# Patient Record
Sex: MALE | Race: WHITE | Employment: UNEMPLOYED | URBAN - METROPOLITAN AREA
[De-identification: names, ages, dates, MRNs, and addresses within clinical notes are randomized per-mention and may not be internally consistent; named-entity substitution may affect disease eponyms.]

---

## 2019-11-27 ENCOUNTER — HOSPITAL ENCOUNTER (EMERGENCY)
Age: 44
Discharge: HOME | End: 2019-11-27
Attending: EMERGENCY MEDICINE
Payer: COMMERCIAL

## 2019-11-27 ENCOUNTER — APPOINTMENT (OUTPATIENT)
Dept: GENERAL RADIOLOGY | Age: 44
End: 2019-11-27
Payer: COMMERCIAL

## 2019-11-27 ENCOUNTER — APPOINTMENT (OUTPATIENT)
Dept: CT IMAGING | Age: 44
End: 2019-11-27
Payer: COMMERCIAL

## 2019-11-27 VITALS
SYSTOLIC BLOOD PRESSURE: 162 MMHG | HEIGHT: 68 IN | DIASTOLIC BLOOD PRESSURE: 103 MMHG | BODY MASS INDEX: 35.92 KG/M2 | TEMPERATURE: 97.6 F | RESPIRATION RATE: 17 BRPM | WEIGHT: 237 LBS | HEART RATE: 69 BPM | OXYGEN SATURATION: 95 %

## 2019-11-27 DIAGNOSIS — M94.9 OSTEOCHONDRAL LESION OF TALAR DOME: ICD-10-CM

## 2019-11-27 DIAGNOSIS — M25.572 ACUTE LEFT ANKLE PAIN: Primary | ICD-10-CM

## 2019-11-27 DIAGNOSIS — M89.9 OSTEOCHONDRAL LESION OF TALAR DOME: ICD-10-CM

## 2019-11-27 PROCEDURE — 73700 CT LOWER EXTREMITY W/O DYE: CPT

## 2019-11-27 PROCEDURE — 99284 EMERGENCY DEPT VISIT MOD MDM: CPT

## 2019-11-27 PROCEDURE — 73610 X-RAY EXAM OF ANKLE: CPT

## 2019-11-27 ASSESSMENT — PAIN DESCRIPTION - PAIN TYPE: TYPE: ACUTE PAIN

## 2019-11-27 ASSESSMENT — ENCOUNTER SYMPTOMS
SHORTNESS OF BREATH: 0
NAUSEA: 0
VOMITING: 0
BACK PAIN: 0
ABDOMINAL PAIN: 0
COUGH: 0
SORE THROAT: 0

## 2019-11-27 ASSESSMENT — PAIN DESCRIPTION - ORIENTATION: ORIENTATION: LEFT

## 2019-11-27 ASSESSMENT — PAIN SCALES - GENERAL: PAINLEVEL_OUTOF10: 5

## 2019-11-27 ASSESSMENT — PAIN DESCRIPTION - LOCATION: LOCATION: ANKLE

## 2019-11-27 ASSESSMENT — PAIN DESCRIPTION - DESCRIPTORS: DESCRIPTORS: CONSTANT

## 2021-05-16 ENCOUNTER — APPOINTMENT (OUTPATIENT)
Dept: GENERAL RADIOLOGY | Age: 46
DRG: 192 | End: 2021-05-16
Payer: MEDICAID

## 2021-05-16 ENCOUNTER — HOSPITAL ENCOUNTER (INPATIENT)
Age: 46
LOS: 3 days | Discharge: HOME | DRG: 192 | End: 2021-05-20
Attending: EMERGENCY MEDICINE | Admitting: INTERNAL MEDICINE
Payer: MEDICAID

## 2021-05-16 DIAGNOSIS — R79.89 ELEVATED BRAIN NATRIURETIC PEPTIDE (BNP) LEVEL: ICD-10-CM

## 2021-05-16 DIAGNOSIS — I16.0 HYPERTENSIVE URGENCY: Primary | ICD-10-CM

## 2021-05-16 DIAGNOSIS — R00.0 TACHYCARDIA: ICD-10-CM

## 2021-05-16 DIAGNOSIS — I48.92 ATRIAL FLUTTER, UNSPECIFIED TYPE (HCC): ICD-10-CM

## 2021-05-16 LAB
BASOPHILS ABSOLUTE: 0.1 K/UL (ref 0–0.2)
BASOPHILS RELATIVE PERCENT: 0.9 %
EOSINOPHILS ABSOLUTE: 0.2 K/UL (ref 0–0.6)
EOSINOPHILS RELATIVE PERCENT: 2.2 %
HCT VFR BLD CALC: 38.9 % (ref 40.5–52.5)
HEMOGLOBIN: 12.6 G/DL (ref 13.5–17.5)
LYMPHOCYTES ABSOLUTE: 1.2 K/UL (ref 1–5.1)
LYMPHOCYTES RELATIVE PERCENT: 15.1 %
MCH RBC QN AUTO: 28.3 PG (ref 26–34)
MCHC RBC AUTO-ENTMCNC: 32.4 G/DL (ref 31–36)
MCV RBC AUTO: 87.4 FL (ref 80–100)
MONOCYTES ABSOLUTE: 0.8 K/UL (ref 0–1.3)
MONOCYTES RELATIVE PERCENT: 9.4 %
NEUTROPHILS ABSOLUTE: 5.8 K/UL (ref 1.7–7.7)
NEUTROPHILS RELATIVE PERCENT: 72.4 %
PDW BLD-RTO: 15.2 % (ref 12.4–15.4)
PLATELET # BLD: 275 K/UL (ref 135–450)
PMV BLD AUTO: 7.4 FL (ref 5–10.5)
RBC # BLD: 4.45 M/UL (ref 4.2–5.9)
WBC # BLD: 8 K/UL (ref 4–11)

## 2021-05-16 PROCEDURE — 83605 ASSAY OF LACTIC ACID: CPT

## 2021-05-16 PROCEDURE — 96376 TX/PRO/DX INJ SAME DRUG ADON: CPT

## 2021-05-16 PROCEDURE — 71046 X-RAY EXAM CHEST 2 VIEWS: CPT

## 2021-05-16 PROCEDURE — 96374 THER/PROPH/DIAG INJ IV PUSH: CPT

## 2021-05-16 PROCEDURE — 93005 ELECTROCARDIOGRAM TRACING: CPT | Performed by: EMERGENCY MEDICINE

## 2021-05-16 PROCEDURE — 2500000003 HC RX 250 WO HCPCS: Performed by: EMERGENCY MEDICINE

## 2021-05-16 PROCEDURE — 99283 EMERGENCY DEPT VISIT LOW MDM: CPT

## 2021-05-16 PROCEDURE — 96375 TX/PRO/DX INJ NEW DRUG ADDON: CPT

## 2021-05-16 PROCEDURE — 83880 ASSAY OF NATRIURETIC PEPTIDE: CPT

## 2021-05-16 PROCEDURE — 80053 COMPREHEN METABOLIC PANEL: CPT

## 2021-05-16 PROCEDURE — 2580000003 HC RX 258: Performed by: EMERGENCY MEDICINE

## 2021-05-16 PROCEDURE — 84484 ASSAY OF TROPONIN QUANT: CPT

## 2021-05-16 PROCEDURE — 85025 COMPLETE CBC W/AUTO DIFF WBC: CPT

## 2021-05-16 RX ORDER — LABETALOL HYDROCHLORIDE 5 MG/ML
10 INJECTION, SOLUTION INTRAVENOUS ONCE
Status: COMPLETED | OUTPATIENT
Start: 2021-05-16 | End: 2021-05-16

## 2021-05-16 RX ORDER — 0.9 % SODIUM CHLORIDE 0.9 %
500 INTRAVENOUS SOLUTION INTRAVENOUS ONCE
Status: COMPLETED | OUTPATIENT
Start: 2021-05-16 | End: 2021-05-17

## 2021-05-16 RX ADMIN — LABETALOL HYDROCHLORIDE 10 MG: 5 INJECTION, SOLUTION INTRAVENOUS at 23:55

## 2021-05-16 RX ADMIN — SODIUM CHLORIDE 500 ML: 9 INJECTION, SOLUTION INTRAVENOUS at 23:54

## 2021-05-16 ASSESSMENT — PAIN DESCRIPTION - LOCATION: LOCATION: LEG

## 2021-05-16 ASSESSMENT — PAIN DESCRIPTION - DESCRIPTORS: DESCRIPTORS: TIGHTNESS

## 2021-05-16 ASSESSMENT — PAIN DESCRIPTION - ORIENTATION: ORIENTATION: LOWER;LEFT;RIGHT

## 2021-05-17 PROBLEM — R65.10 SIRS (SYSTEMIC INFLAMMATORY RESPONSE SYNDROME) (HCC): Status: ACTIVE | Noted: 2021-05-17

## 2021-05-17 LAB
A/G RATIO: 1.3 (ref 1.1–2.2)
ALBUMIN SERPL-MCNC: 3.6 G/DL (ref 3.4–5)
ALBUMIN SERPL-MCNC: 3.8 G/DL (ref 3.4–5)
ALP BLD-CCNC: 85 U/L (ref 40–129)
ALP BLD-CCNC: 88 U/L (ref 40–129)
ALT SERPL-CCNC: 64 U/L (ref 10–40)
ALT SERPL-CCNC: 66 U/L (ref 10–40)
ANION GAP SERPL CALCULATED.3IONS-SCNC: 10 MMOL/L (ref 3–16)
ANION GAP SERPL CALCULATED.3IONS-SCNC: 7 MMOL/L (ref 3–16)
AST SERPL-CCNC: 32 U/L (ref 15–37)
AST SERPL-CCNC: 42 U/L (ref 15–37)
BASOPHILS ABSOLUTE: 0.1 K/UL (ref 0–0.2)
BASOPHILS RELATIVE PERCENT: 0.9 %
BILIRUB SERPL-MCNC: 0.7 MG/DL (ref 0–1)
BILIRUB SERPL-MCNC: 0.9 MG/DL (ref 0–1)
BILIRUBIN DIRECT: <0.2 MG/DL (ref 0–0.3)
BILIRUBIN URINE: NEGATIVE
BILIRUBIN, INDIRECT: ABNORMAL MG/DL (ref 0–1)
BLOOD, URINE: NEGATIVE
BUN BLDV-MCNC: 14 MG/DL (ref 7–20)
BUN BLDV-MCNC: 15 MG/DL (ref 7–20)
CALCIUM SERPL-MCNC: 8.8 MG/DL (ref 8.3–10.6)
CALCIUM SERPL-MCNC: 8.9 MG/DL (ref 8.3–10.6)
CHLORIDE BLD-SCNC: 102 MMOL/L (ref 99–110)
CHLORIDE BLD-SCNC: 99 MMOL/L (ref 99–110)
CHOLESTEROL, TOTAL: 111 MG/DL (ref 0–199)
CLARITY: CLEAR
CO2: 25 MMOL/L (ref 21–32)
CO2: 26 MMOL/L (ref 21–32)
COLOR: YELLOW
CREAT SERPL-MCNC: 1.2 MG/DL (ref 0.9–1.3)
CREAT SERPL-MCNC: 1.3 MG/DL (ref 0.9–1.3)
EKG ATRIAL RATE: 140 BPM
EKG ATRIAL RATE: 278 BPM
EKG DIAGNOSIS: NORMAL
EKG DIAGNOSIS: NORMAL
EKG P AXIS: 190 DEGREES
EKG P AXIS: 90 DEGREES
EKG P-R INTERVAL: 82 MS
EKG Q-T INTERVAL: 262 MS
EKG Q-T INTERVAL: 296 MS
EKG QRS DURATION: 150 MS
EKG QRS DURATION: 88 MS
EKG QTC CALCULATION (BAZETT): 388 MS
EKG QTC CALCULATION (BAZETT): 451 MS
EKG R AXIS: 63 DEGREES
EKG R AXIS: 75 DEGREES
EKG T AXIS: -87 DEGREES
EKG T AXIS: 47 DEGREES
EKG VENTRICULAR RATE: 132 BPM
EKG VENTRICULAR RATE: 140 BPM
EOSINOPHILS ABSOLUTE: 0.2 K/UL (ref 0–0.6)
EOSINOPHILS RELATIVE PERCENT: 2.6 %
GFR AFRICAN AMERICAN: >60
GFR AFRICAN AMERICAN: >60
GFR NON-AFRICAN AMERICAN: 59
GFR NON-AFRICAN AMERICAN: >60
GLOBULIN: 2.7 G/DL
GLUCOSE BLD-MCNC: 128 MG/DL (ref 70–99)
GLUCOSE BLD-MCNC: 130 MG/DL (ref 70–99)
GLUCOSE URINE: NEGATIVE MG/DL
HCT VFR BLD CALC: 39.5 % (ref 40.5–52.5)
HDLC SERPL-MCNC: 34 MG/DL (ref 40–60)
HEMOGLOBIN: 12.8 G/DL (ref 13.5–17.5)
KETONES, URINE: NEGATIVE MG/DL
LACTIC ACID, SEPSIS: 1.2 MMOL/L (ref 0.4–1.9)
LDL CHOLESTEROL CALCULATED: 65 MG/DL
LEUKOCYTE ESTERASE, URINE: NEGATIVE
LYMPHOCYTES ABSOLUTE: 1.5 K/UL (ref 1–5.1)
LYMPHOCYTES RELATIVE PERCENT: 19.5 %
MAGNESIUM: 2 MG/DL (ref 1.8–2.4)
MCH RBC QN AUTO: 28.3 PG (ref 26–34)
MCHC RBC AUTO-ENTMCNC: 32.4 G/DL (ref 31–36)
MCV RBC AUTO: 87.3 FL (ref 80–100)
MICROSCOPIC EXAMINATION: NORMAL
MONOCYTES ABSOLUTE: 0.9 K/UL (ref 0–1.3)
MONOCYTES RELATIVE PERCENT: 11.4 %
NEUTROPHILS ABSOLUTE: 4.9 K/UL (ref 1.7–7.7)
NEUTROPHILS RELATIVE PERCENT: 65.6 %
NITRITE, URINE: NEGATIVE
PDW BLD-RTO: 15.3 % (ref 12.4–15.4)
PH UA: 7 (ref 5–8)
PLATELET # BLD: 304 K/UL (ref 135–450)
PMV BLD AUTO: 7.6 FL (ref 5–10.5)
POTASSIUM SERPL-SCNC: 3.6 MMOL/L (ref 3.5–5.1)
POTASSIUM SERPL-SCNC: 3.8 MMOL/L (ref 3.5–5.1)
PRO-BNP: 1811 PG/ML (ref 0–124)
PRO-BNP: 1895 PG/ML (ref 0–124)
PROTEIN UA: NEGATIVE MG/DL
RBC # BLD: 4.52 M/UL (ref 4.2–5.9)
SODIUM BLD-SCNC: 134 MMOL/L (ref 136–145)
SODIUM BLD-SCNC: 135 MMOL/L (ref 136–145)
SPECIFIC GRAVITY UA: 1.01 (ref 1–1.03)
SPECIMEN STATUS: NORMAL
TOTAL PROTEIN: 6.3 G/DL (ref 6.4–8.2)
TOTAL PROTEIN: 6.7 G/DL (ref 6.4–8.2)
TRIGL SERPL-MCNC: 62 MG/DL (ref 0–150)
TROPONIN: <0.01 NG/ML
TSH SERPL DL<=0.05 MIU/L-ACNC: 1.66 UIU/ML (ref 0.27–4.2)
URINE TYPE: NORMAL
UROBILINOGEN, URINE: 0.2 E.U./DL
VLDLC SERPL CALC-MCNC: 12 MG/DL
WBC # BLD: 7.5 K/UL (ref 4–11)

## 2021-05-17 PROCEDURE — 80061 LIPID PANEL: CPT

## 2021-05-17 PROCEDURE — 84443 ASSAY THYROID STIM HORMONE: CPT

## 2021-05-17 PROCEDURE — 85025 COMPLETE CBC W/AUTO DIFF WBC: CPT

## 2021-05-17 PROCEDURE — 2500000003 HC RX 250 WO HCPCS: Performed by: EMERGENCY MEDICINE

## 2021-05-17 PROCEDURE — 80076 HEPATIC FUNCTION PANEL: CPT

## 2021-05-17 PROCEDURE — 6360000002 HC RX W HCPCS: Performed by: NURSE PRACTITIONER

## 2021-05-17 PROCEDURE — 81003 URINALYSIS AUTO W/O SCOPE: CPT

## 2021-05-17 PROCEDURE — 6360000002 HC RX W HCPCS: Performed by: INTERNAL MEDICINE

## 2021-05-17 PROCEDURE — 2500000003 HC RX 250 WO HCPCS: Performed by: NURSE PRACTITIONER

## 2021-05-17 PROCEDURE — 6370000000 HC RX 637 (ALT 250 FOR IP): Performed by: NURSE PRACTITIONER

## 2021-05-17 PROCEDURE — 6360000002 HC RX W HCPCS: Performed by: EMERGENCY MEDICINE

## 2021-05-17 PROCEDURE — 93010 ELECTROCARDIOGRAM REPORT: CPT | Performed by: INTERNAL MEDICINE

## 2021-05-17 PROCEDURE — 99254 IP/OBS CNSLTJ NEW/EST MOD 60: CPT | Performed by: INTERNAL MEDICINE

## 2021-05-17 PROCEDURE — 80048 BASIC METABOLIC PNL TOTAL CA: CPT

## 2021-05-17 PROCEDURE — 2580000003 HC RX 258: Performed by: NURSE PRACTITIONER

## 2021-05-17 PROCEDURE — 84484 ASSAY OF TROPONIN QUANT: CPT

## 2021-05-17 PROCEDURE — 83735 ASSAY OF MAGNESIUM: CPT

## 2021-05-17 PROCEDURE — 83880 ASSAY OF NATRIURETIC PEPTIDE: CPT

## 2021-05-17 PROCEDURE — 93005 ELECTROCARDIOGRAM TRACING: CPT | Performed by: INTERNAL MEDICINE

## 2021-05-17 PROCEDURE — 1200000000 HC SEMI PRIVATE

## 2021-05-17 PROCEDURE — 6370000000 HC RX 637 (ALT 250 FOR IP): Performed by: INTERNAL MEDICINE

## 2021-05-17 RX ORDER — METOPROLOL SUCCINATE 50 MG/1
50 TABLET, EXTENDED RELEASE ORAL DAILY
Status: DISCONTINUED | OUTPATIENT
Start: 2021-05-18 | End: 2021-05-18

## 2021-05-17 RX ORDER — ACETAMINOPHEN 650 MG/1
650 SUPPOSITORY RECTAL EVERY 6 HOURS PRN
Status: DISCONTINUED | OUTPATIENT
Start: 2021-05-17 | End: 2021-05-20 | Stop reason: HOSPADM

## 2021-05-17 RX ORDER — POLYETHYLENE GLYCOL 3350 17 G/17G
17 POWDER, FOR SOLUTION ORAL DAILY PRN
Status: DISCONTINUED | OUTPATIENT
Start: 2021-05-17 | End: 2021-05-20 | Stop reason: HOSPADM

## 2021-05-17 RX ORDER — METOPROLOL SUCCINATE 50 MG/1
25 TABLET, EXTENDED RELEASE ORAL DAILY
Status: DISCONTINUED | OUTPATIENT
Start: 2021-05-17 | End: 2021-05-17

## 2021-05-17 RX ORDER — ACETAMINOPHEN 325 MG/1
650 TABLET ORAL EVERY 6 HOURS PRN
Status: DISCONTINUED | OUTPATIENT
Start: 2021-05-17 | End: 2021-05-20 | Stop reason: HOSPADM

## 2021-05-17 RX ORDER — LISINOPRIL 10 MG/1
10 TABLET ORAL DAILY
Status: DISCONTINUED | OUTPATIENT
Start: 2021-05-17 | End: 2021-05-19

## 2021-05-17 RX ORDER — POTASSIUM CHLORIDE 20 MEQ/1
40 TABLET, EXTENDED RELEASE ORAL ONCE
Status: COMPLETED | OUTPATIENT
Start: 2021-05-17 | End: 2021-05-17

## 2021-05-17 RX ORDER — FUROSEMIDE 10 MG/ML
40 INJECTION INTRAMUSCULAR; INTRAVENOUS ONCE
Status: COMPLETED | OUTPATIENT
Start: 2021-05-17 | End: 2021-05-17

## 2021-05-17 RX ORDER — METOPROLOL SUCCINATE 50 MG/1
25 TABLET, EXTENDED RELEASE ORAL ONCE
Status: COMPLETED | OUTPATIENT
Start: 2021-05-17 | End: 2021-05-17

## 2021-05-17 RX ORDER — ONDANSETRON 2 MG/ML
4 INJECTION INTRAMUSCULAR; INTRAVENOUS EVERY 6 HOURS PRN
Status: DISCONTINUED | OUTPATIENT
Start: 2021-05-17 | End: 2021-05-20 | Stop reason: HOSPADM

## 2021-05-17 RX ORDER — DIGOXIN 0.25 MG/ML
250 INJECTION INTRAMUSCULAR; INTRAVENOUS EVERY 4 HOURS
Status: COMPLETED | OUTPATIENT
Start: 2021-05-17 | End: 2021-05-17

## 2021-05-17 RX ORDER — FUROSEMIDE 10 MG/ML
40 INJECTION INTRAMUSCULAR; INTRAVENOUS 2 TIMES DAILY
Status: DISCONTINUED | OUTPATIENT
Start: 2021-05-17 | End: 2021-05-20

## 2021-05-17 RX ORDER — SODIUM CHLORIDE 0.9 % (FLUSH) 0.9 %
5-40 SYRINGE (ML) INJECTION EVERY 12 HOURS SCHEDULED
Status: DISCONTINUED | OUTPATIENT
Start: 2021-05-17 | End: 2021-05-20 | Stop reason: HOSPADM

## 2021-05-17 RX ORDER — SODIUM CHLORIDE 0.9 % (FLUSH) 0.9 %
5-40 SYRINGE (ML) INJECTION PRN
Status: DISCONTINUED | OUTPATIENT
Start: 2021-05-17 | End: 2021-05-20 | Stop reason: HOSPADM

## 2021-05-17 RX ORDER — SODIUM CHLORIDE 9 MG/ML
25 INJECTION, SOLUTION INTRAVENOUS PRN
Status: DISCONTINUED | OUTPATIENT
Start: 2021-05-17 | End: 2021-05-20 | Stop reason: HOSPADM

## 2021-05-17 RX ORDER — LABETALOL HYDROCHLORIDE 5 MG/ML
10 INJECTION, SOLUTION INTRAVENOUS ONCE
Status: COMPLETED | OUTPATIENT
Start: 2021-05-17 | End: 2021-05-17

## 2021-05-17 RX ORDER — PROMETHAZINE HYDROCHLORIDE 25 MG/1
12.5 TABLET ORAL EVERY 6 HOURS PRN
Status: DISCONTINUED | OUTPATIENT
Start: 2021-05-17 | End: 2021-05-20 | Stop reason: HOSPADM

## 2021-05-17 RX ORDER — DIGOXIN 0.25 MG/ML
500 INJECTION INTRAMUSCULAR; INTRAVENOUS ONCE
Status: COMPLETED | OUTPATIENT
Start: 2021-05-17 | End: 2021-05-17

## 2021-05-17 RX ORDER — LABETALOL HYDROCHLORIDE 5 MG/ML
10 INJECTION, SOLUTION INTRAVENOUS EVERY 4 HOURS PRN
Status: DISCONTINUED | OUTPATIENT
Start: 2021-05-17 | End: 2021-05-20 | Stop reason: HOSPADM

## 2021-05-17 RX ADMIN — METOPROLOL SUCCINATE 25 MG: 50 TABLET, EXTENDED RELEASE ORAL at 12:31

## 2021-05-17 RX ADMIN — DIGOXIN 250 MCG: 250 INJECTION, SOLUTION INTRAMUSCULAR; INTRAVENOUS; PARENTERAL at 20:41

## 2021-05-17 RX ADMIN — FUROSEMIDE 40 MG: 10 INJECTION, SOLUTION INTRAMUSCULAR; INTRAVENOUS at 09:20

## 2021-05-17 RX ADMIN — ENOXAPARIN SODIUM 40 MG: 40 INJECTION SUBCUTANEOUS at 09:20

## 2021-05-17 RX ADMIN — POTASSIUM CHLORIDE 40 MEQ: 1500 TABLET, EXTENDED RELEASE ORAL at 12:31

## 2021-05-17 RX ADMIN — DIGOXIN 250 MCG: 250 INJECTION, SOLUTION INTRAMUSCULAR; INTRAVENOUS; PARENTERAL at 17:57

## 2021-05-17 RX ADMIN — Medication 10 ML: at 20:47

## 2021-05-17 RX ADMIN — LABETALOL HYDROCHLORIDE 10 MG: 5 INJECTION, SOLUTION INTRAVENOUS at 04:44

## 2021-05-17 RX ADMIN — LISINOPRIL 10 MG: 10 TABLET ORAL at 12:31

## 2021-05-17 RX ADMIN — LABETALOL HYDROCHLORIDE 10 MG: 5 INJECTION, SOLUTION INTRAVENOUS at 01:48

## 2021-05-17 RX ADMIN — DIGOXIN 500 MCG: 250 INJECTION, SOLUTION INTRAMUSCULAR; INTRAVENOUS; PARENTERAL at 12:31

## 2021-05-17 RX ADMIN — Medication 10 ML: at 09:21

## 2021-05-17 RX ADMIN — FUROSEMIDE 40 MG: 10 INJECTION, SOLUTION INTRAMUSCULAR; INTRAVENOUS at 00:47

## 2021-05-17 RX ADMIN — ENOXAPARIN SODIUM 100 MG: 100 INJECTION SUBCUTANEOUS at 20:41

## 2021-05-17 RX ADMIN — LABETALOL HYDROCHLORIDE 10 MG: 5 INJECTION, SOLUTION INTRAVENOUS at 20:41

## 2021-05-17 RX ADMIN — METOPROLOL SUCCINATE 25 MG: 50 TABLET, EXTENDED RELEASE ORAL at 09:20

## 2021-05-17 RX ADMIN — FUROSEMIDE 40 MG: 10 INJECTION, SOLUTION INTRAMUSCULAR; INTRAVENOUS at 17:57

## 2021-05-17 ASSESSMENT — ENCOUNTER SYMPTOMS
EYE PAIN: 0
DIARRHEA: 0
CONSTIPATION: 0
COUGH: 0
ABDOMINAL PAIN: 0
SORE THROAT: 0
SHORTNESS OF BREATH: 1
VOMITING: 0
TACHYPNEA: 1
RHINORRHEA: 0
BLOOD IN STOOL: 0
NAUSEA: 0

## 2021-05-17 ASSESSMENT — PAIN DESCRIPTION - PAIN TYPE: TYPE: ACUTE PAIN

## 2021-05-17 ASSESSMENT — PAIN SCALES - GENERAL
PAINLEVEL_OUTOF10: 0
PAINLEVEL_OUTOF10: 1

## 2021-05-17 ASSESSMENT — PAIN DESCRIPTION - LOCATION: LOCATION: HEAD

## 2021-05-17 NOTE — PLAN OF CARE
Problem: OXYGENATION/RESPIRATORY FUNCTION  Goal: Patient will maintain patent airway  Outcome: Ongoing     Patient's EF (Ejection Fraction) is not on file    Heart Failure Medications:   Diuretics[de-identified] Furosemide     (One of the following REQUIRED for EF <40%/SYSTOLIC FAILURE but MAY be used in EF% >40%/DIASTOLIC FAILURE)        ACE[de-identified] Lisinopril        ARB[de-identified] None         ARNI[de-identified] None    (Beta Blockers)   NON- Evidenced Based Beta Blocker (for EF% >40%/DIASTOLIC FAILURE): Metoprolol TARTrate- Lopressor     Evidenced Based Beta Blocker::(REQUIRED for EF% <40%/SYSTOLIC FAILURE) None  . .................................................................................................................................................. Patient's weights and intake/output reviewed: Yes    Patient's Last Weight: 269.5  lbs  Obtained by standing scale       Intake/Output Summary (Last 24 hours) at 5/17/2021 1801  Last data filed at 5/17/2021 1800  Gross per 24 hour   Intake 960 ml   Output 5950 ml   Net -4990 ml       Comorbidities Reviewed Yes    Patient has a past medical history of Diverticulitis, Hypertension, and Kidney stone. >>For CHF and Comorbidity documentation on Education Time and Topics, please see Education Tab    Progressive Mobility Assessment:  What is this patient's Current Level of Mobility?: Ambulatory-Up Ad Aminta  How was this patient Mobilized today?: Edge of Bed, Up to Chair,  Up to Toilet/Shower, and Up in Room                 With Whom? Self                 Level of Difficulty/Assistance: Independent     Pt resting in bed at this time on room air. Pt denies shortness of breath. Pt with pitting lower extremity edema.      Patient and/or Family's stated Goal of Care this Admission: increase activity tolerance, better understand heart failure and disease management, be more comfortable, and reduce lower extremity edema prior to discharge        :

## 2021-05-17 NOTE — H&P
Hospital Medicine History & Physical      PCP: No primary care provider on file. Date of Admission: 5/16/2021    Date of Service: Pt seen/examined on 5/17/2021 and Admitted to Inpatient with expected LOS greater than two midnights due to medical therapy. Chief Complaint:  Shortness of breath and hypertension    History Of Present Illness:      51 y.o. male, with PMH of HTN and obesity, who presented to [facility] with shortness of breath and hypertension. History obtained from the patient and review of EMR. Patient stated roughly 3 weeks ago he began to experience intermittent headaches. He stated at the time he was also having intermittent shortness of breath and fatigue. Patient stated he would monitor his blood pressure at home and noticed that it was elevated at times. However, the patient stated over the last few days his headaches, shortness of breath and fatigue have been getting progressively worse and today he noticed that his legs were swollen. The patient stated he does not take any blood pressure medication at home at this time. He stated he stopped taking his medications roughly 1 year ago due to being \"hard headed\". In the emergency room the patient was found to have a blood pressure of 201/149 and he was given labetalol. A chest x-ray was also obtained that revealed mild cardiomegaly and mild central pulmonary congestion. The patient's proBNP was 1811. He was given 40 mg of IV Lasix. Patient denies any history of CHF and stated he does not follow with cardiology. He will be admitted for further evaluation and treatment. An echocardiogram and cardiology consult have been ordered for the a. m. The patient denied any other associated symptoms as well as any aggravating and/or alleviating factors. At the time of this assessment, the patient was resting comfortably in bed.  He currently denies any chest pain, back pain, abdominal pain, shortness of breath, numbness, tingling, N/V/C/D, fever Skin color, texture, turgor normal.  No significant rashes or lesions. Neurologic:  Neurovascularly intact. Cranial nerves: II-XII intact, grossly non-focal.  Psychiatric:  Alert and oriented, thought content appropriate, normal insight  Capillary Refill: Brisk,3 seconds, normal  Peripheral Pulses: +2 palpable, equal bilaterally     Labs:     Recent Labs     05/16/21  2344   WBC 8.0   HGB 12.6*   HCT 38.9*        Recent Labs     05/16/21  2344   *   K 3.8      CO2 26   BUN 15   CREATININE 1.3   CALCIUM 8.8     Recent Labs     05/16/21  2344   AST 42*   ALT 66*   BILITOT 0.7   ALKPHOS 85     Recent Labs     05/16/21  2344   TROPONINI <0.01     Urinalysis:    No results found for: Angela Eulalia, BACTERIA, RBCUA, BLOODU, [address]    Radiology:     CXR: I have reviewed the CXR with the following interpretation: mild cardiomegaly and mild central pulmonary congestion. No obvious infiltrate or effusion    EKG:  I have reviewed the EKG with the following interpretation: The Ekg interpreted in the absence of a cardiologist shows Sinus tachycardia with short NJ, rate 140 Non-specific intra-ventricular conduction block. Abnormal ECG. When compared with ECG of 21-SEP-2005 22:51, Vent. rate has increased BY  52 BPM. Questionable change in QRS duration    XR CHEST (2 VW)   Final Result   Mild cardiomegaly and mild central pulmonary congestion which is more   prominent with no obvious infiltrate or effusion. ASSESSMENT:    Active Hospital Problems    Diagnosis Date Noted    Hypertension [I10]     Obesity [E66.9]      PLAN:    SIRS in patient with shortness of breath likely 2/2 new onset CHF. Tachycardia 139, temp 99.3, lactic 1.2 on admission  -CXR revealed: mild cardiomegaly and mild central pulmonary congestion.  No obvious infiltrate or effusion  -ProBNP 1,811 on admission  -40 mg iv lasix given in ED  -tele monitoring  -40 mg IV lasix BID  -ECHO in am  -strict I&O  -daily weights -cardiology consult - appreciate recommendations in advance    Hypertensive urgency in patient with known h/o HTN  -labetalol given in ED  -continue  -metoprolol initiated  -prn labetalol    Obesity  With Body mass index is 37.1 kg/m². Complicating assessment and treatment. Placing patient at risk for multiple co-morbidities as well as early death and contributing to the patient's presentation. Counseled on weight loss    Acute transaminitis   -Pt with elevated AST 42 /ALT 66  -unclear etiology  -will trend    Normocytic anemia, 12.6/38.9 on admission  -no s/s of bleeding at this time  -cbc in am    DVT Prophylaxis: Lovenox    Diet: No diet orders on file     Code Status: No Order    PT/OT Eval Status: No indication for need at this time    Dispo - 2-3 days pending clinical improvement     JAMIE Holm - CNP    Thank you No primary care provider on file. for the opportunity to be involved in this patient's care.  If you have any questions or concerns please feel free to contact me at [phone].  -----------------------Anticipated Dr. Gina fontenot-----------------------------------------

## 2021-05-18 LAB
ALBUMIN SERPL-MCNC: 4 G/DL (ref 3.4–5)
ALP BLD-CCNC: 85 U/L (ref 40–129)
ALT SERPL-CCNC: 52 U/L (ref 10–40)
ANION GAP SERPL CALCULATED.3IONS-SCNC: 9 MMOL/L (ref 3–16)
AST SERPL-CCNC: 24 U/L (ref 15–37)
BILIRUB SERPL-MCNC: 1.7 MG/DL (ref 0–1)
BILIRUBIN DIRECT: 0.3 MG/DL (ref 0–0.3)
BILIRUBIN, INDIRECT: 1.4 MG/DL (ref 0–1)
BUN BLDV-MCNC: 19 MG/DL (ref 7–20)
CALCIUM SERPL-MCNC: 9.5 MG/DL (ref 8.3–10.6)
CHLORIDE BLD-SCNC: 98 MMOL/L (ref 99–110)
CHOLESTEROL, TOTAL: 123 MG/DL (ref 0–199)
CO2: 29 MMOL/L (ref 21–32)
CREAT SERPL-MCNC: 1.2 MG/DL (ref 0.9–1.3)
GFR AFRICAN AMERICAN: >60
GFR NON-AFRICAN AMERICAN: >60
GLUCOSE BLD-MCNC: 113 MG/DL (ref 70–99)
HCT VFR BLD CALC: 45.8 % (ref 40.5–52.5)
HDLC SERPL-MCNC: 33 MG/DL (ref 40–60)
HEMOGLOBIN: 14.8 G/DL (ref 13.5–17.5)
LDL CHOLESTEROL CALCULATED: 70 MG/DL
LV EF: 30 %
LVEF MODALITY: NORMAL
MAGNESIUM: 2 MG/DL (ref 1.8–2.4)
MCH RBC QN AUTO: 28.2 PG (ref 26–34)
MCHC RBC AUTO-ENTMCNC: 32.3 G/DL (ref 31–36)
MCV RBC AUTO: 87.4 FL (ref 80–100)
PDW BLD-RTO: 15.2 % (ref 12.4–15.4)
PLATELET # BLD: 316 K/UL (ref 135–450)
PMV BLD AUTO: 7.7 FL (ref 5–10.5)
POTASSIUM SERPL-SCNC: 4.5 MMOL/L (ref 3.5–5.1)
RBC # BLD: 5.24 M/UL (ref 4.2–5.9)
SODIUM BLD-SCNC: 136 MMOL/L (ref 136–145)
TOTAL PROTEIN: 7.5 G/DL (ref 6.4–8.2)
TRIGL SERPL-MCNC: 99 MG/DL (ref 0–150)
VLDLC SERPL CALC-MCNC: 20 MG/DL
WBC # BLD: 10.4 K/UL (ref 4–11)

## 2021-05-18 PROCEDURE — 6370000000 HC RX 637 (ALT 250 FOR IP): Performed by: NURSE PRACTITIONER

## 2021-05-18 PROCEDURE — 6360000002 HC RX W HCPCS: Performed by: INTERNAL MEDICINE

## 2021-05-18 PROCEDURE — C8929 TTE W OR WO FOL WCON,DOPPLER: HCPCS

## 2021-05-18 PROCEDURE — 2580000003 HC RX 258: Performed by: NURSE PRACTITIONER

## 2021-05-18 PROCEDURE — 80076 HEPATIC FUNCTION PANEL: CPT

## 2021-05-18 PROCEDURE — 80061 LIPID PANEL: CPT

## 2021-05-18 PROCEDURE — 85027 COMPLETE CBC AUTOMATED: CPT

## 2021-05-18 PROCEDURE — 6360000002 HC RX W HCPCS: Performed by: NURSE PRACTITIONER

## 2021-05-18 PROCEDURE — 36415 COLL VENOUS BLD VENIPUNCTURE: CPT

## 2021-05-18 PROCEDURE — 1200000000 HC SEMI PRIVATE

## 2021-05-18 PROCEDURE — 6360000004 HC RX CONTRAST MEDICATION: Performed by: NURSE PRACTITIONER

## 2021-05-18 PROCEDURE — 83735 ASSAY OF MAGNESIUM: CPT

## 2021-05-18 PROCEDURE — 6370000000 HC RX 637 (ALT 250 FOR IP): Performed by: INTERNAL MEDICINE

## 2021-05-18 PROCEDURE — 99233 SBSQ HOSP IP/OBS HIGH 50: CPT | Performed by: NURSE PRACTITIONER

## 2021-05-18 PROCEDURE — 80048 BASIC METABOLIC PNL TOTAL CA: CPT

## 2021-05-18 RX ORDER — AMLODIPINE BESYLATE 5 MG/1
5 TABLET ORAL DAILY
Status: DISCONTINUED | OUTPATIENT
Start: 2021-05-18 | End: 2021-05-18

## 2021-05-18 RX ORDER — HYDRALAZINE HYDROCHLORIDE 25 MG/1
25 TABLET, FILM COATED ORAL EVERY 8 HOURS SCHEDULED
Status: DISCONTINUED | OUTPATIENT
Start: 2021-05-18 | End: 2021-05-20

## 2021-05-18 RX ORDER — DIGOXIN 125 MCG
125 TABLET ORAL DAILY
Status: DISCONTINUED | OUTPATIENT
Start: 2021-05-18 | End: 2021-05-19

## 2021-05-18 RX ORDER — METOPROLOL SUCCINATE 50 MG/1
50 TABLET, EXTENDED RELEASE ORAL 2 TIMES DAILY
Status: DISCONTINUED | OUTPATIENT
Start: 2021-05-18 | End: 2021-05-20

## 2021-05-18 RX ADMIN — ACETAMINOPHEN 650 MG: 325 TABLET ORAL at 21:21

## 2021-05-18 RX ADMIN — ENOXAPARIN SODIUM 100 MG: 100 INJECTION SUBCUTANEOUS at 09:56

## 2021-05-18 RX ADMIN — ENOXAPARIN SODIUM 100 MG: 100 INJECTION SUBCUTANEOUS at 21:22

## 2021-05-18 RX ADMIN — DIGOXIN 125 MCG: 125 TABLET ORAL at 17:36

## 2021-05-18 RX ADMIN — FUROSEMIDE 40 MG: 10 INJECTION, SOLUTION INTRAMUSCULAR; INTRAVENOUS at 17:37

## 2021-05-18 RX ADMIN — Medication 10 ML: at 21:21

## 2021-05-18 RX ADMIN — Medication 10 ML: at 09:56

## 2021-05-18 RX ADMIN — METOPROLOL SUCCINATE 50 MG: 50 TABLET, EXTENDED RELEASE ORAL at 09:56

## 2021-05-18 RX ADMIN — LISINOPRIL 10 MG: 10 TABLET ORAL at 09:56

## 2021-05-18 RX ADMIN — LABETALOL HYDROCHLORIDE 10 MG: 5 INJECTION, SOLUTION INTRAVENOUS at 12:30

## 2021-05-18 RX ADMIN — FUROSEMIDE 40 MG: 10 INJECTION, SOLUTION INTRAMUSCULAR; INTRAVENOUS at 09:56

## 2021-05-18 RX ADMIN — LABETALOL HYDROCHLORIDE 10 MG: 5 INJECTION, SOLUTION INTRAVENOUS at 00:43

## 2021-05-18 RX ADMIN — ACETAMINOPHEN 650 MG: 325 TABLET ORAL at 15:13

## 2021-05-18 RX ADMIN — METOPROLOL SUCCINATE 50 MG: 50 TABLET, EXTENDED RELEASE ORAL at 21:21

## 2021-05-18 RX ADMIN — HYDRALAZINE HYDROCHLORIDE 25 MG: 25 TABLET, FILM COATED ORAL at 17:36

## 2021-05-18 RX ADMIN — PERFLUTREN 1.65 MG: 6.52 INJECTION, SUSPENSION INTRAVENOUS at 06:44

## 2021-05-18 ASSESSMENT — ENCOUNTER SYMPTOMS
RESPIRATORY NEGATIVE: 1
GASTROINTESTINAL NEGATIVE: 1

## 2021-05-18 ASSESSMENT — PAIN SCALES - GENERAL
PAINLEVEL_OUTOF10: 3
PAINLEVEL_OUTOF10: 0
PAINLEVEL_OUTOF10: 0

## 2021-05-18 NOTE — CARE COORDINATION
CASE MANAGEMENT INITIAL ASSESSMENT    Reviewed chart and completed assessment with: Patient     Explained Case Management role/services. Primary contact information: [facility] Decision Maker :   Primary Decision Maker: cedric prieto - [place] - [phone]    Secondary Decision Maker: Jamila Alexis - [place] - [phone]          Can this person be reached and be able to respond quickly, such as within a few minutes or hours? Yes      Admit date/status:05/17/2021 inpatient   Diagnosis:SIRS  Is this a Readmission?:  No      Insurance:Self pay- referral to Financial    Precert required for SNF: No       3 night stay required: No    Living arrangements, Adls, care needs, prior to admission: Home, alone, one step to enter, Jayne Brothers: [address] at home: Denies    Services in the home and/or outpatient, prior to admission: Denies    PT/OT recs:NA    [address] Notification (HEN):NA    Barriers to discharge: Provided care clinic and PCP list     Plan/comments: Patient plans to dc home, IPTA denies needs.  Writer did provided info on care clinic re no INS and no PCP MARYAM Garner      ECOC on chart for MD signature

## 2021-05-18 NOTE — PLAN OF CARE
Problem: Falls - Risk of:  Goal: Will remain free from falls  Description: Will remain free from falls  Outcome: Ongoing     Problem: Pain:  Goal: Pain level will decrease  Description: Pain level will decrease  Outcome: Ongoing     Problem: OXYGENATION/RESPIRATORY FUNCTION  Goal: Patient will maintain patent airway  5/18/2021 0242 by Kiara Mascorro RN  Outcome: Ongoing       Patient's EF (Ejection Fraction) is unknown      Heart Failure Medications:  Diuretics[de-identified] Furosemide    (One of the following REQUIRED for EF <40%/SYSTOLIC FAILURE but MAY be used in EF% >40%/DIASTOLIC FAILURE)        ACE[de-identified] Lisinopril        ARB[de-identified] None         ARNI[de-identified] None    (Beta Blockers)  NON- Evidenced Based Beta Blocker (for EF% >40%/DIASTOLIC FAILURE): None    Evidenced Based Beta Blocker::(REQUIRED for EF% <40%/SYSTOLIC FAILURE) Metoprolol SUCCinate- Toprol XL  . .................................................................................................................................................. Patient's weights and intake/output reviewed: Yes    Patient's Last Weight: 265 lbs obtained by standing scale. First weight    Intake/Output Summary (Last 24 hours) at 5/18/2021 0242  Last data filed at 5/17/2021 2049  Gross per 24 hour   Intake 1200 ml   Output 8300 ml   Net -7100 ml       Comorbidities Reviewed Yes    Patient has a past medical history of Diverticulitis, Hypertension, and Kidney stone. >>For CHF and Comorbidity documentation on Education Time and Topics, please see Education Tab    Progressive Mobility Assessment:  What is this patient's Current Level of Mobility?: Ambulatory-Up Ad Aminta  How was this patient Mobilized today?: Edge of Bed, Up to Chair,  Up to Toilet/Shower, and Up in Room                 With Whom? Self                 Level of Difficulty/Assistance: Independent     Pt resting in bed at this time on room air. Pt denies shortness of breath. Pt with pitting lower extremity edema.      Patient and/or Family's stated Goal of Care this Admission: reduce shortness of breath, increase activity tolerance, better understand heart failure and disease management, be more comfortable, and reduce lower extremity edema prior to discharge        :

## 2021-05-18 NOTE — PLAN OF CARE
Problem: OXYGENATION/RESPIRATORY FUNCTION  Goal: Patient will maintain patent airway  5/18/2021 1122 by Jenny Jimenez RN  Outcome: Ongoing       Patient's EF (Ejection Fraction) is less than 40%    Heart Failure Medications:  Diuretics[de-identified] Furosemide    (One of the following REQUIRED for EF <40%/SYSTOLIC FAILURE but MAY be used in EF% >40%/DIASTOLIC FAILURE)        ACE[de-identified] Lisinopril        ARB[de-identified] None         ARNI[de-identified] None    (Beta Blockers)  NON- Evidenced Based Beta Blocker (for EF% >40%/DIASTOLIC FAILURE): Metoprolol TARTrate- Lopressor    Evidenced Based Beta Blocker::(REQUIRED for EF% <40%/SYSTOLIC FAILURE) None  . .................................................................................................................................................. Patient's weights and intake/output reviewed: Yes    Patient's Last Weight: 259 lbs obtained by standing scale. Difference of 6 lbs less than last documented weight. Intake/Output Summary (Last 24 hours) at 5/18/2021 1130  Last data filed at 5/18/2021 0957  Gross per 24 hour   Intake 1450 ml   Output 5500 ml   Net -4050 ml       Comorbidities Reviewed Yes    Patient has a past medical history of Diverticulitis, Hypertension, and Kidney stone. >>For CHF and Comorbidity documentation on Education Time and Topics, please see Education Tab    Progressive Mobility Assessment:  What is this patient's Current Level of Mobility?: Ambulatory-Up Ad Aminta  How was this patient Mobilized today?: Edge of Bed, Up to Chair,  Up to Toilet/Shower and Up in Room                 With Whom? Self                 Level of Difficulty/Assistance: Independent     Pt up in chair at this time on room air. Pt denies shortness of breath. Pt with pitting lower extremity edema.      Patient and/or Family's stated Goal of Care this Admission: increase activity tolerance, better understand heart failure and disease management, be more comfortable and reduce lower extremity edema prior to discharge        :        :

## 2021-05-18 NOTE — PROGRESS NOTES
[address]  Cardiology  Progress Note    Admission date:  2021    Reason for follow up visit: CHF, AF    HPI/CC: Angela Bustos is a 51 y.o. male who was admitted 2021 for shortness of breath. EKG showed aflutter. Echo showed EF 30%. Rhythm has been Aflutter 110s. Subjective: Feels much better. Denies chest pain, shortness of breath, palpitations and dizziness. Vitals:  Blood pressure (!) 172/108, pulse 79, temperature 98.2 °F (36.8 °C), temperature source Oral, resp. rate 16, height 5' 6\" (1.676 m), weight 259 lb 9.6 oz (117.8 kg), SpO2 92 %.   Temp  Av.3 °F (36.8 °C)  Min: 98 °F (36.7 °C)  Max: 98.8 °F (37.1 °C)  Pulse  Av.1  Min: 79  Max: 128  BP  Min: 152/106  Max: 186/111  SpO2  Av.3 %  Min: 91 %  Max: 96 %    24 hour I/O    Intake/Output Summary (Last 24 hours) at 2021 1526  Last data filed at 2021 1343  Gross per 24 hour   Intake 970 ml   Output 6100 ml   Net -5130 ml     Current Facility-Administered Medications   Medication Dose Route Frequency Provider Last Rate Last Admin    sodium chloride flush 0.9 % injection 5-40 mL  5-40 mL Intravenous 2 times per day JAMIE Thompson - CNP   10 mL at 21 0956    sodium chloride flush 0.9 % injection 5-40 mL  5-40 mL Intravenous PRN Rosa Maria Ga APRN - CNP        0.9 % sodium chloride infusion  25 mL Intravenous PRN Rosa Maria Ga APRN - CNP        promethazine (PHENERGAN) tablet 12.5 mg  12.5 mg Oral Q6H PRN JAMIE Thompson - CNP        Or    ondansetron (ZOFRAN) injection 4 mg  4 mg Intravenous Q6H PRN Rosa Maria Ga APRN - CNP        polyethylene glycol (GLYCOLAX) packet 17 g  17 g Oral Daily PRN Rosa Maria Ga APRN - CNP        acetaminophen (TYLENOL) tablet 650 mg  650 mg Oral Q6H PRN JAMIE Thompson CNP   650 mg at 21 1513    Or    acetaminophen (TYLENOL) suppository 650 mg  650 mg Rectal Q6H PRN JAMIE Thompson CNP        furosemide (LASIX) injection 40 mg  40 mg Intravenous BID Min Cottrell APRMARCOI Pino CNP   40 mg at 05/18/21 0956    labetalol (NORMODYNE;TRANDATE) injection 10 mg  10 mg Intravenous Q4H PRN Min Cottrell APRMARCIO Pino CNP   10 mg at 05/18/21 1230    enoxaparin (LOVENOX) injection 100 mg  1 mg/kg Subcutaneous BID Michael Haddox, DO   100 mg at 05/18/21 0956    metoprolol succinate (TOPROL XL) extended release tablet 50 mg  50 mg Oral Daily Michael Jacksondox, DO   50 mg at 05/18/21 0956    lisinopril (PRINIVIL;ZESTRIL) tablet 10 mg  10 mg Oral Daily Michael Jacksondox, DO   10 mg at 05/18/21 7719     Review of Systems   Constitutional: Negative. Respiratory: Negative. Cardiovascular: Negative. Gastrointestinal: Negative. Neurological: Negative. Objective:     Telemetry monitor: Aflutter 110s    Physical Exam:  Constitutional:  Comfortable and alert, NAD, appears stated age  Eyes: PERRL, sclera nonicteric  Neck:  Supple, no masses, no thyroidmegaly, no JVD  Skin:  Warm and dry; no rash or lesions  Heart: Irregular, normal apex, S1 and S2 normal, no M/G/R  Lungs:  Normal respiratory effort; clear; no wheezing/rhonchi/rales  Abdomen: soft, non tender, + bowel sounds  Extremities: 1+ BLE edema  Neuro: alert and oriented, moves legs and arms equally, normal mood and affect    Data Reviewed:    Echo 5/18/2021:  Technically difficult examination. Left ventricular function and wall motion is difficult to estimate due to   poor endocardial visualization. Moderate LV dysfunction with global hypokinesis, EF 30%   Normal left ventricular size with moderate concentric left ventricular   hypertrophy.     Lab Reviewed:     Renal Profile:  Lab Results   Component Value Date    CREATININE 1.2 05/18/2021    BUN 19 05/18/2021     05/18/2021    K 4.5 05/18/2021    CL 98 05/18/2021    CO2 29 05/18/2021     CBC:    Lab Results   Component Value Date    WBC 10.4 05/18/2021    RBC 5.24 05/18/2021    HGB 14.8 05/18/2021    HCT 45.8 05/18/2021    MCV 87.4 05/18/2021    RDW 15.2 05/18/2021  05/18/2021     BNP:    Lab Results   Component Value Date    PROBNP 1,895 05/17/2021    PROBNP 1,811 05/16/2021     Fasting Lipid Panel:    Lab Results   Component Value Date    CHOL 123 05/18/2021    HDL 33 05/18/2021    TRIG 99 05/18/2021     Cardiac Enzymes:  CK/MbTroponin  Lab Results   Component Value Date    TROPONINI <0.01 05/17/2021     PT/ INR No results found for: INR, PROTIME  PTT No results found for: PTT   Lab Results   Component Value Date    MG 2.00 05/18/2021      Lab Results   Component Value Date    TSH 1.66 05/17/2021     All labs and imaging reviewed today    Assessment:  Acute systolic CHF: improved, -9L and -6 lbs overnight  Cardiomyopathy, etiology unknown: new diagnosis, EF 30%  Atrial flutter of unknown duration: rates uncontrolled   - [id number] score 2 (CHF, HTN)  HTN: uncontrolled  Obesity    Plan:   1. Increase toprol to BID  2. Add po digoxin  3. Add hydralazine for HTN/afterload reduction  4. Continue diuresis with lasix 40 mg BID  5. Continue therapeutic lovenox  6. Continue lisinopril  7. NPO at midnight for LINDSAY/CV with Dr. Myke Ray tomorrow.  Once more euvolemic, will plan on PHOEBE MAHONEY [facility] per Dr. Stone Brooks, APRN-CNP  [address]  [phone]

## 2021-05-18 NOTE — PROGRESS NOTES
Hospitalist Progress Note      PCP: No primary care provider on file. Date of Admission: 5/16/2021    Chief Complaint: Shortness of breath and hypertension    Subjective: No new c/o. Medications:  Reviewed    Infusion Medications    sodium chloride       Scheduled Medications    sodium chloride flush  5-40 mL Intravenous 2 times per day    furosemide  40 mg Intravenous BID    enoxaparin  1 mg/kg Subcutaneous BID    metoprolol succinate  50 mg Oral Daily    lisinopril  10 mg Oral Daily     PRN Meds: sodium chloride flush, sodium chloride, promethazine **OR** ondansetron, polyethylene glycol, acetaminophen **OR** acetaminophen, labetalol      Intake/Output Summary (Last 24 hours) at 5/18/2021 0933  Last data filed at 5/17/2021 2049  Gross per 24 hour   Intake 1200 ml   Output 5700 ml   Net -4500 ml       Physical Exam Performed:    BP (!) 185/121   Pulse 81   Temp 98 °F (36.7 °C) (Oral)   Resp 22   Ht 5' 6\" (1.676 m)   Wt 259 lb 9.6 oz (117.8 kg)   SpO2 94%   BMI 41.90 kg/m²     General appearance: No apparent distress, appears stated age and cooperative. HEENT: Pupils equal, round, and reactive to light. Conjunctivae/corneas clear. Neck: Supple, with full range of motion. No jugular venous distention. Trachea midline. Respiratory:  Normal respiratory effort. Clear to auscultation, bilaterally without Rales/Wheezes/Rhonchi. Cardiovascular: Regular rate and rhythm with normal S1/S2 without murmurs, rubs or gallops. Abdomen: Soft, non-tender, non-distended with normal bowel sounds. Musculoskeletal: No clubbing, cyanosis w/ 1+ LE edema bilaterally. Full range of motion without deformity. Skin: Skin color, texture, turgor normal.  No rashes or lesions. Neurologic:  Neurovascularly intact without any focal sensory/motor deficits.  Cranial nerves: II-XII intact, grossly non-focal.  Psychiatric: Alert and oriented, thought content appropriate, normal insight  Capillary Refill: Brisk,< 3 seconds   Peripheral Pulses: +2 palpable, equal bilaterally       Labs:   Recent Labs     05/16/21  2344 05/17/21  0451 05/18/21  0740   WBC 8.0 7.5 10.4   HGB 12.6* 12.8* 14.8   HCT 38.9* 39.5* 45.8    304 316     Recent Labs     05/16/21  2344 05/17/21  0451 05/18/21  0740   * 134* 136   K 3.8 3.6 4.5    99 98*   CO2 26 25 29   BUN 15 14 19   CREATININE 1.3 1.2 1.2   CALCIUM 8.8 8.9 9.5     Recent Labs     05/16/21  2344 05/17/21  0451 05/18/21  0740   AST 42* 32 24   ALT 66* 64* 52*   BILIDIR  --  <0.2 0.3   BILITOT 0.7 0.9 1.7*   ALKPHOS 85 88 85     No results for input(s): INR in the last 72 hours. Recent Labs     05/16/21  2344 05/17/21  0331 05/17/21  0451   TROPONINI <0.01 <0.01 <0.01       Urinalysis:      Lab Results   Component Value Date    NITRU Negative 05/17/2021    BLOODU Negative 05/17/2021    SPECGRAV 1.015 05/17/2021    GLUCOSEU Negative 05/17/2021       Consults:    IP CONSULT TO CARDIOLOGY  IP CONSULT TO CASE MANAGEMENT  IP CONSULT TO FINANCIAL COUNSELOR      Assessment/Plan:    Active Hospital Problems    Diagnosis     SIRS (systemic inflammatory response syndrome) ([facility] [address] [R65.10]     Hypertension [I10]     Obesity [E66.9]        CHF - acute systolic failure w/ reduced EF 30% by Echo dated 18 May. Likely due to hypertensive heart disease but may be rate related failure. Continue diuresis as currently ordered and follow I/O as well as clinical response. CXR w/ cardiomegaly/vascular congestion w/ elevated BNP. Cardiology consulted and appreciated    HTN - w/out known CAD and no evidence of active signs/sxs of ischemia/failure. Uncontrolled on admission. Currently improved on BBlocker meds w/ vitals reviewed and documented as above.      Morbid Obesity -  With Body mass index is 41.9 kg/m². Complicating assessment and treatment. Placing patient at risk for multiple co-morbidities as well as early death and contributing to the patient's presentation.  Counseled on weight loss.      Acute transaminitis - likely 2nd to above. Will continue to follow serial labs. Reviewed and documented as above.      Anemia - etiology clinically unable to determine, w/out evidence of active bleeding/hemolysis. Asymptomatic w/out indication for transfusion. Follow serial labs. Reviewed and documented as above. Morbid Obesity -  With Body mass index is 41.9 kg/m². Complicating assessment and treatment. Placing patient at risk for multiple co-morbidities as well as early death and contributing to the patient's presentation. Counseled on weight loss.          DVT Prophylaxis: Tx dose LMWH     Recent Labs     05/16/21  2344 05/17/21  0451 05/18/21  0740    304 316     Diet: DIET LOW SODIUM 2 GM; 1500 ml  Code Status: Full Code      PT/OT Eval Status: not ordered. Dispo - Possibly Thurs 20 May pending clinical course and subspecialty recs.       Shiva Felix MD

## 2021-05-19 ENCOUNTER — ANESTHESIA EVENT (OUTPATIENT)
Dept: CARDIAC CATH/INVASIVE PROCEDURES | Age: 46
DRG: 192 | End: 2021-05-19
Payer: MEDICAID

## 2021-05-19 ENCOUNTER — ANESTHESIA (OUTPATIENT)
Dept: CARDIAC CATH/INVASIVE PROCEDURES | Age: 46
DRG: 192 | End: 2021-05-19
Payer: MEDICAID

## 2021-05-19 ENCOUNTER — APPOINTMENT (OUTPATIENT)
Dept: CARDIAC CATH/INVASIVE PROCEDURES | Age: 46
DRG: 192 | End: 2021-05-19
Payer: MEDICAID

## 2021-05-19 VITALS — SYSTOLIC BLOOD PRESSURE: 109 MMHG | OXYGEN SATURATION: 97 % | DIASTOLIC BLOOD PRESSURE: 79 MMHG

## 2021-05-19 LAB
ALBUMIN SERPL-MCNC: 3.9 G/DL (ref 3.4–5)
ALP BLD-CCNC: 92 U/L (ref 40–129)
ALT SERPL-CCNC: 37 U/L (ref 10–40)
ANION GAP SERPL CALCULATED.3IONS-SCNC: 13 MMOL/L (ref 3–16)
AST SERPL-CCNC: 17 U/L (ref 15–37)
BILIRUB SERPL-MCNC: 1.5 MG/DL (ref 0–1)
BILIRUBIN DIRECT: 0.3 MG/DL (ref 0–0.3)
BILIRUBIN, INDIRECT: 1.2 MG/DL (ref 0–1)
BUN BLDV-MCNC: 24 MG/DL (ref 7–20)
CALCIUM SERPL-MCNC: 9.3 MG/DL (ref 8.3–10.6)
CHLORIDE BLD-SCNC: 94 MMOL/L (ref 99–110)
CO2: 30 MMOL/L (ref 21–32)
CREAT SERPL-MCNC: 1.2 MG/DL (ref 0.9–1.3)
EKG ATRIAL RATE: 85 BPM
EKG DIAGNOSIS: NORMAL
EKG P AXIS: 51 DEGREES
EKG P-R INTERVAL: 154 MS
EKG Q-T INTERVAL: 396 MS
EKG QRS DURATION: 88 MS
EKG QTC CALCULATION (BAZETT): 471 MS
EKG R AXIS: 36 DEGREES
EKG T AXIS: 46 DEGREES
EKG VENTRICULAR RATE: 85 BPM
GFR AFRICAN AMERICAN: >60
GFR NON-AFRICAN AMERICAN: >60
GLUCOSE BLD-MCNC: 121 MG/DL (ref 70–99)
HCT VFR BLD CALC: 48.3 % (ref 40.5–52.5)
HEMOGLOBIN: 15.8 G/DL (ref 13.5–17.5)
LV EF: 30 %
LVEF MODALITY: NORMAL
MAGNESIUM: 2.1 MG/DL (ref 1.8–2.4)
MCH RBC QN AUTO: 28.1 PG (ref 26–34)
MCHC RBC AUTO-ENTMCNC: 32.7 G/DL (ref 31–36)
MCV RBC AUTO: 85.9 FL (ref 80–100)
PDW BLD-RTO: 15.2 % (ref 12.4–15.4)
PLATELET # BLD: 332 K/UL (ref 135–450)
PMV BLD AUTO: 7.2 FL (ref 5–10.5)
POTASSIUM SERPL-SCNC: 4.3 MMOL/L (ref 3.5–5.1)
RBC # BLD: 5.62 M/UL (ref 4.2–5.9)
SARS-COV-2, NAAT: NOT DETECTED
SODIUM BLD-SCNC: 137 MMOL/L (ref 136–145)
TOTAL PROTEIN: 7.4 G/DL (ref 6.4–8.2)
WBC # BLD: 9.4 K/UL (ref 4–11)

## 2021-05-19 PROCEDURE — 93315 ECHO TRANSESOPHAGEAL: CPT

## 2021-05-19 PROCEDURE — 36415 COLL VENOUS BLD VENIPUNCTURE: CPT

## 2021-05-19 PROCEDURE — 93010 ELECTROCARDIOGRAM REPORT: CPT | Performed by: INTERNAL MEDICINE

## 2021-05-19 PROCEDURE — 6360000002 HC RX W HCPCS: Performed by: NURSE PRACTITIONER

## 2021-05-19 PROCEDURE — B24BZZ4 ULTRASONOGRAPHY OF HEART WITH AORTA, TRANSESOPHAGEAL: ICD-10-PCS | Performed by: INTERNAL MEDICINE

## 2021-05-19 PROCEDURE — 85027 COMPLETE CBC AUTOMATED: CPT

## 2021-05-19 PROCEDURE — 2580000003 HC RX 258: Performed by: NURSE PRACTITIONER

## 2021-05-19 PROCEDURE — 87635 SARS-COV-2 COVID-19 AMP PRB: CPT

## 2021-05-19 PROCEDURE — 6360000002 HC RX W HCPCS: Performed by: NURSE ANESTHETIST, CERTIFIED REGISTERED

## 2021-05-19 PROCEDURE — 1200000000 HC SEMI PRIVATE

## 2021-05-19 PROCEDURE — 80076 HEPATIC FUNCTION PANEL: CPT

## 2021-05-19 PROCEDURE — 3700000000 HC ANESTHESIA ATTENDED CARE

## 2021-05-19 PROCEDURE — 2580000003 HC RX 258: Performed by: NURSE ANESTHETIST, CERTIFIED REGISTERED

## 2021-05-19 PROCEDURE — 2500000003 HC RX 250 WO HCPCS: Performed by: NURSE ANESTHETIST, CERTIFIED REGISTERED

## 2021-05-19 PROCEDURE — 93312 ECHO TRANSESOPHAGEAL: CPT | Performed by: INTERNAL MEDICINE

## 2021-05-19 PROCEDURE — 5A2204Z RESTORATION OF CARDIAC RHYTHM, SINGLE: ICD-10-PCS | Performed by: INTERNAL MEDICINE

## 2021-05-19 PROCEDURE — 6370000000 HC RX 637 (ALT 250 FOR IP): Performed by: INTERNAL MEDICINE

## 2021-05-19 PROCEDURE — 6370000000 HC RX 637 (ALT 250 FOR IP): Performed by: NURSE PRACTITIONER

## 2021-05-19 PROCEDURE — 93325 DOPPLER ECHO COLOR FLOW MAPG: CPT | Performed by: INTERNAL MEDICINE

## 2021-05-19 PROCEDURE — 83735 ASSAY OF MAGNESIUM: CPT

## 2021-05-19 PROCEDURE — 80048 BASIC METABOLIC PNL TOTAL CA: CPT

## 2021-05-19 PROCEDURE — 99024 POSTOP FOLLOW-UP VISIT: CPT | Performed by: INTERNAL MEDICINE

## 2021-05-19 PROCEDURE — 6360000002 HC RX W HCPCS: Performed by: INTERNAL MEDICINE

## 2021-05-19 PROCEDURE — 92960 CARDIOVERSION ELECTRIC EXT: CPT | Performed by: INTERNAL MEDICINE

## 2021-05-19 PROCEDURE — 93005 ELECTROCARDIOGRAM TRACING: CPT | Performed by: INTERNAL MEDICINE

## 2021-05-19 PROCEDURE — 2500000003 HC RX 250 WO HCPCS

## 2021-05-19 PROCEDURE — 93320 DOPPLER ECHO COMPLETE: CPT

## 2021-05-19 PROCEDURE — 93325 DOPPLER ECHO COLOR FLOW MAPG: CPT

## 2021-05-19 PROCEDURE — 92960 CARDIOVERSION ELECTRIC EXT: CPT

## 2021-05-19 PROCEDURE — 3700000001 HC ADD 15 MINUTES (ANESTHESIA)

## 2021-05-19 RX ORDER — PROPOFOL 10 MG/ML
INJECTION, EMULSION INTRAVENOUS PRN
Status: DISCONTINUED | OUTPATIENT
Start: 2021-05-19 | End: 2021-05-19 | Stop reason: SDUPTHER

## 2021-05-19 RX ORDER — LISINOPRIL 20 MG/1
20 TABLET ORAL DAILY
Status: DISCONTINUED | OUTPATIENT
Start: 2021-05-20 | End: 2021-05-20 | Stop reason: HOSPADM

## 2021-05-19 RX ORDER — MORPHINE SULFATE 4 MG/ML
2 INJECTION, SOLUTION INTRAMUSCULAR; INTRAVENOUS EVERY 5 MIN PRN
Status: DISCONTINUED | OUTPATIENT
Start: 2021-05-19 | End: 2021-05-20 | Stop reason: HOSPADM

## 2021-05-19 RX ORDER — OXYCODONE HYDROCHLORIDE AND ACETAMINOPHEN 5; 325 MG/1; MG/1
2 TABLET ORAL PRN
Status: ACTIVE | OUTPATIENT
Start: 2021-05-19 | End: 2021-05-19

## 2021-05-19 RX ORDER — ATORVASTATIN CALCIUM 40 MG/1
40 TABLET, FILM COATED ORAL NIGHTLY
Status: DISCONTINUED | OUTPATIENT
Start: 2021-05-19 | End: 2021-05-20

## 2021-05-19 RX ORDER — MORPHINE SULFATE 4 MG/ML
1 INJECTION, SOLUTION INTRAMUSCULAR; INTRAVENOUS EVERY 5 MIN PRN
Status: DISCONTINUED | OUTPATIENT
Start: 2021-05-19 | End: 2021-05-20 | Stop reason: HOSPADM

## 2021-05-19 RX ORDER — ASPIRIN 81 MG/1
81 TABLET, CHEWABLE ORAL DAILY
Status: DISCONTINUED | OUTPATIENT
Start: 2021-05-20 | End: 2021-05-20

## 2021-05-19 RX ORDER — ONDANSETRON 2 MG/ML
4 INJECTION INTRAMUSCULAR; INTRAVENOUS
Status: ACTIVE | OUTPATIENT
Start: 2021-05-19 | End: 2021-05-19

## 2021-05-19 RX ORDER — LIDOCAINE HYDROCHLORIDE 20 MG/ML
INJECTION, SOLUTION INFILTRATION; PERINEURAL PRN
Status: DISCONTINUED | OUTPATIENT
Start: 2021-05-19 | End: 2021-05-19 | Stop reason: SDUPTHER

## 2021-05-19 RX ORDER — SODIUM CHLORIDE 9 MG/ML
INJECTION, SOLUTION INTRAVENOUS CONTINUOUS PRN
Status: DISCONTINUED | OUTPATIENT
Start: 2021-05-19 | End: 2021-05-19 | Stop reason: SDUPTHER

## 2021-05-19 RX ORDER — OXYCODONE HYDROCHLORIDE AND ACETAMINOPHEN 5; 325 MG/1; MG/1
1 TABLET ORAL PRN
Status: ACTIVE | OUTPATIENT
Start: 2021-05-19 | End: 2021-05-19

## 2021-05-19 RX ORDER — MEPERIDINE HYDROCHLORIDE 50 MG/ML
12.5 INJECTION INTRAMUSCULAR; INTRAVENOUS; SUBCUTANEOUS EVERY 5 MIN PRN
Status: DISCONTINUED | OUTPATIENT
Start: 2021-05-19 | End: 2021-05-20 | Stop reason: HOSPADM

## 2021-05-19 RX ORDER — LISINOPRIL 10 MG/1
10 TABLET ORAL ONCE
Status: COMPLETED | OUTPATIENT
Start: 2021-05-19 | End: 2021-05-20

## 2021-05-19 RX ADMIN — DIGOXIN 125 MCG: 125 TABLET ORAL at 08:51

## 2021-05-19 RX ADMIN — LISINOPRIL 10 MG: 10 TABLET ORAL at 08:51

## 2021-05-19 RX ADMIN — HYDRALAZINE HYDROCHLORIDE 25 MG: 25 TABLET, FILM COATED ORAL at 20:57

## 2021-05-19 RX ADMIN — FUROSEMIDE 40 MG: 10 INJECTION, SOLUTION INTRAMUSCULAR; INTRAVENOUS at 08:52

## 2021-05-19 RX ADMIN — METOPROLOL SUCCINATE 50 MG: 50 TABLET, EXTENDED RELEASE ORAL at 20:57

## 2021-05-19 RX ADMIN — Medication 10 ML: at 08:52

## 2021-05-19 RX ADMIN — FUROSEMIDE 40 MG: 10 INJECTION, SOLUTION INTRAMUSCULAR; INTRAVENOUS at 17:06

## 2021-05-19 RX ADMIN — LIDOCAINE HYDROCHLORIDE 100 MG: 20 INJECTION, SOLUTION INFILTRATION; PERINEURAL at 12:18

## 2021-05-19 RX ADMIN — METOPROLOL SUCCINATE 50 MG: 50 TABLET, EXTENDED RELEASE ORAL at 08:51

## 2021-05-19 RX ADMIN — PROPOFOL 370 MG: 10 INJECTION, EMULSION INTRAVENOUS at 12:18

## 2021-05-19 RX ADMIN — SODIUM CHLORIDE: 9 INJECTION, SOLUTION INTRAVENOUS at 12:09

## 2021-05-19 RX ADMIN — HYDRALAZINE HYDROCHLORIDE 25 MG: 25 TABLET, FILM COATED ORAL at 05:01

## 2021-05-19 RX ADMIN — ENOXAPARIN SODIUM 100 MG: 100 INJECTION SUBCUTANEOUS at 08:52

## 2021-05-19 RX ADMIN — ENOXAPARIN SODIUM 100 MG: 100 INJECTION SUBCUTANEOUS at 20:57

## 2021-05-19 RX ADMIN — Medication 10 ML: at 20:57

## 2021-05-19 RX ADMIN — HYDRALAZINE HYDROCHLORIDE 25 MG: 25 TABLET, FILM COATED ORAL at 13:49

## 2021-05-19 ASSESSMENT — LIFESTYLE VARIABLES: SMOKING_STATUS: 0

## 2021-05-19 ASSESSMENT — ENCOUNTER SYMPTOMS
ABDOMINAL PAIN: 0
BACK PAIN: 0
RHINORRHEA: 0
NAUSEA: 0
PHOTOPHOBIA: 0
VOMITING: 0
SHORTNESS OF BREATH: 1
COLOR CHANGE: 0
SHORTNESS OF BREATH: 0
WHEEZING: 0

## 2021-05-19 ASSESSMENT — PAIN SCALES - GENERAL
PAINLEVEL_OUTOF10: 0

## 2021-05-19 NOTE — PLAN OF CARE
Problem: OXYGENATION/RESPIRATORY FUNCTION  Goal: Patient will maintain patent airway  5/19/2021 0956 by Christina Jamil RN  Outcome: Ongoing  Note:   Patient's EF (Ejection Fraction) is less than 40%    Heart Failure Medications:  Diuretics[de-identified] Furosemide    (One of the following REQUIRED for EF <40%/SYSTOLIC FAILURE but MAY be used in EF% >40%/DIASTOLIC FAILURE)        ACE[de-identified] Lisinopril        ARB[de-identified] None         ARNI[de-identified] None    (Beta Blockers)  NON- Evidenced Based Beta Blocker (for EF% >40%/DIASTOLIC FAILURE): None    Evidenced Based Beta Blocker::(REQUIRED for EF% <40%/SYSTOLIC FAILURE) Metoprolol SUCCinate- Toprol XL  . .................................................................................................................................................. Patient's weights and intake/output reviewed: Yes    Patient's Last Weight: 254 lbs obtained by standing scale. Difference of 5 lbs less than last documented weight. Intake/Output Summary (Last 24 hours) at 5/19/2021 0957  Last data filed at 5/19/2021 0914  Gross per 24 hour   Intake 840 ml   Output 3000 ml   Net -2160 ml       Comorbidities Reviewed Yes    Patient has a past medical history of Diverticulitis, Hypertension, and Kidney stone. >>For CHF and Comorbidity documentation on Education Time and Topics, please see Education Tab    Progressive Mobility Assessment:  What is this patient's Current Level of Mobility?: Ambulatory-Up Ad Aminta  How was this patient Mobilized today?: Up to Chair                 With Whom? Self                 Level of Difficulty/Assistance: Independent     Pt up in chair at this time on room air. Pt denies shortness of breath. Pt with pitting lower extremity edema.      Patient and/or Family's stated Goal of Care this Admission: reduce shortness of breath, increase activity tolerance, better understand heart failure and disease management, be more comfortable, and reduce lower extremity edema prior to discharge

## 2021-05-19 NOTE — ANESTHESIA POSTPROCEDURE EVALUATION
Department of Anesthesiology  Postprocedure Note    Patient: Emelyn Cox  MRN: [id number]  YOB: 1975  Date of evaluation: 5/19/2021  Time:  5:14 PM     Procedure Summary     Date: 05/19/21 Room / Location: [facility] Cardiac Cath Lab    Anesthesia Start: 1217 Anesthesia Stop: 1802    Procedure: Pam Kobus CARDIOVERSION W/ANES Diagnosis:     Scheduled Providers:  Responsible Provider: Quintin Guardado MD    Anesthesia Type: TIVA ASA Status: 3          Anesthesia Type: TIVA    Sukumar Phase I:      Sukumar Phase II:      Last vitals: Reviewed and per EMR flowsheets.      Vitals:    05/19/21 0759 05/19/21 1105 05/19/21 1325 05/19/21 1700   BP: (!) 135/94 (!) 166/104 (!) 138/90 (!) 140/88   Pulse: 71 120 86 84   Resp: 16 16 16 16   Temp: 98.5 °F (36.9 °C) 98 °F (36.7 °C) 98.4 °F (36.9 °C) 98.1 °F (36.7 °C)   TempSrc: Oral Oral Oral Oral   SpO2: 95% 94% 94% 95%   Weight:       Height:         Anesthesia Post Evaluation    Patient location during evaluation: bedside  Patient participation: complete - patient participated  Level of consciousness: awake and alert  Airway patency: patent  Nausea & Vomiting: no nausea  Complications: no  Cardiovascular status: hemodynamically stable  Respiratory status: acceptable  Hydration status: euvolemic

## 2021-05-19 NOTE — PLAN OF CARE
Problem: Falls - Risk of:  Goal: Will remain free from falls  Description: Will remain free from falls  Outcome: Ongoing  Note: Pt will remain free from falls throughout hospital stay. Bed in lowest position with wheels locked and side rails 2/4 up. Hourly rounding completed. Patient ambulates safely independently, call light is within reach. Will continue to monitor throughout shift. Problem: Pain:  Goal: Pain level will decrease  Description: Pain level will decrease  Outcome: Ongoing  Note: Pt will be satisfied with pain control. Pt uses numeric pain rating scale with reassessments after pain med administration. Patient denies pain at this time. Will continue to monitor progression throughout shift.

## 2021-05-19 NOTE — PROGRESS NOTES
Hospitalist Progress Note      PCP: No primary care provider on file. Date of Admission: 5/16/2021    Chief Complaint: Shortness of breath and hypertension    Subjective: No new c/o. Medications:  Reviewed    Infusion Medications    sodium chloride       Scheduled Medications    metoprolol succinate  50 mg Oral BID    digoxin  125 mcg Oral Daily    hydrALAZINE  25 mg Oral 3 times per day    sodium chloride flush  5-40 mL Intravenous 2 times per day    furosemide  40 mg Intravenous BID    enoxaparin  1 mg/kg Subcutaneous BID    lisinopril  10 mg Oral Daily     PRN Meds: sodium chloride flush, sodium chloride, promethazine **OR** ondansetron, polyethylene glycol, acetaminophen **OR** acetaminophen, labetalol      Intake/Output Summary (Last 24 hours) at 5/19/2021 0945  Last data filed at 5/19/2021 0914  Gross per 24 hour   Intake 1210 ml   Output 3800 ml   Net -2590 ml       Physical Exam Performed:    BP (!) 135/94   Pulse 71   Temp 98.5 °F (36.9 °C) (Oral)   Resp 16   Ht 5' 6\" (1.676 m)   Wt 254 lb 11.2 oz (115.5 kg)   SpO2 95%   BMI 41.11 kg/m²     General appearance: No apparent distress, appears stated age and cooperative. HEENT: Pupils equal, round, and reactive to light. Conjunctivae/corneas clear. Neck: Supple, with full range of motion. No jugular venous distention. Trachea midline. Respiratory:  Normal respiratory effort. Clear to auscultation, bilaterally without Rales/Wheezes/Rhonchi. Cardiovascular: Regular rate and rhythm with normal S1/S2 without murmurs, rubs or gallops. Abdomen: Soft, non-tender, non-distended with normal bowel sounds. Musculoskeletal: No clubbing, cyanosis w/ 1+ LE edema bilaterally. Full range of motion without deformity. Skin: Skin color, texture, turgor normal.  No rashes or lesions. Neurologic:  Neurovascularly intact without any focal sensory/motor deficits.  Cranial nerves: II-XII intact, grossly non-focal.  Psychiatric: Alert and oriented, thought content appropriate, normal insight  Capillary Refill: Brisk,< 3 seconds   Peripheral Pulses: +2 palpable, equal bilaterally       Labs:   Recent Labs     05/17/21  0451 05/18/21  0740 05/19/21  0652   WBC 7.5 10.4 9.4   HGB 12.8* 14.8 15.8   HCT 39.5* 45.8 48.3    316 332     Recent Labs     05/17/21  0451 05/18/21  0740 05/19/21  0652   * 136 137   K 3.6 4.5 4.3   CL 99 98* 94*   CO2 25 29 30   BUN 14 19 24*   CREATININE 1.2 1.2 1.2   CALCIUM 8.9 9.5 9.3     Recent Labs     05/17/21  0451 05/18/21  0740 05/19/21  0652   AST 32 24 17   ALT 64* 52* 37   BILIDIR <0.2 0.3 0.3   BILITOT 0.9 1.7* 1.5*   ALKPHOS 88 85 92     No results for input(s): INR in the last 72 hours. Recent Labs     05/16/21  2344 05/17/21  0331 05/17/21  0451   TROPONINI <0.01 <0.01 <0.01       Urinalysis:      Lab Results   Component Value Date    NITRU Negative 05/17/2021    BLOODU Negative 05/17/2021    SPECGRAV 1.015 05/17/2021    GLUCOSEU Negative 05/17/2021       Consults:    IP CONSULT TO CARDIOLOGY  IP CONSULT TO CASE MANAGEMENT  IP CONSULT TO FINANCIAL COUNSELOR      Assessment/Plan:    Active Hospital Problems    Diagnosis     SIRS (systemic inflammatory response syndrome) ([facility] [address] [R65.10]     Hypertension [I10]     Obesity [E66.9]        CHF - acute systolic failure w/ reduced EF 30% by Echo dated 18 May. Likely due to hypertensive heart disease but may be rate related failure. Eventually ischemic etiology will have to be ruled out w/ future RHC/LHC planned when euvolemic. Continue diuresis as currently ordered and follow I/O as well as clinical response. CXR w/ cardiomegaly/vascular congestion w/ elevated BNP. Cardiology consulted and appreciated    Aflutter - on BBlocker/Dig and remains tachy. Tx dose LMWH. Plans for LINDSAY/DCCV Wed 19 May. HTN - w/out known CAD and no evidence of active signs/sxs of ischemia/failure. Uncontrolled on admission.  Currently improved on BBlocker meds w/ vitals reviewed and documented as above.       Acute transaminitis - likely 2nd to above. Will continue to follow serial labs. Reviewed and documented as above.      Anemia - etiology clinically unable to determine, w/out evidence of active bleeding/hemolysis. Stable and asymptomatic w/out indication for transfusion. Follow serial labs. Reviewed and documented as above. Morbid Obesity -  With Body mass index is 41.11 kg/m². Complicating assessment and treatment. Placing patient at risk for multiple co-morbidities as well as early death and contributing to the patient's presentation. Counseled on weight loss.        DVT Prophylaxis: Tx dose LMWH     Recent Labs     05/17/21  0451 05/18/21  0740 05/19/21  0652    316 332     Diet: Diet NPO Time Specified Exceptions are: Sips with Meds  Code Status: Full Code      PT/OT Eval Status: not ordered. Dispo - Possibly Thurs/Friday 20/21 May pending clinical course and subspecialty recs.       Prince Tatiana MD

## 2021-05-19 NOTE — PROCEDURES
5/19/2021    PROCEDURE PERFORMED:     1. LINDSAY  2. Direct current cardioversion    INDICATIONS: Persistent atrial flutter    PROCEDURE: The patient was brought to the lab in a fasting non-sedated state and consent for the procedure was obtained. A time-out was performed and the patient was sedated with IV propofol administered by the anesthesiology team. After ensuring adequate sedation, a LINDSAY was performed and showed no evidence of thrombus in the left atrial appendage. A synchronized 100 Joules shock was delivered and the patient converted to atrial fibrillation. A second synchronized shock was then delivered at 200J which successfully converted the patient to normal sinus rhythm. He tolerated the procedure well and made complete hemodynamic and neurovascular recovery. CONCLUSION: Successful direct current cardioversion. PLAN:   1. Continue SC Lovenox 1 mg/kg BID and will eventually transition to [address] after patient undergoes cardiac catheterization. 2. Continue metoprolol succinate 50 mg BID. 3. Will discontinue digoxin.       Lela Minaya, [address]   [phone] [facility] office   [phone] [facility]

## 2021-05-19 NOTE — ANESTHESIA PRE PROCEDURE
Department of Anesthesiology  Preprocedure Note       Name:  Lorene Stroud   Age:  51 y.o.  :  1975                                          MRN:  [id number]         Date:  2021      Surgeon: * Surgery not found *    Procedure:     Medications prior to admission:   Prior to Admission medications    Not on File       Current medications:    Current Facility-Administered Medications   Medication Dose Route Frequency Provider Last Rate Last Admin    metoprolol succinate (TOPROL XL) extended release tablet 50 mg  50 mg Oral BID Naheed Ora, APRN - CNP   50 mg at 21 6628    digoxin (LANOXIN) tablet 125 mcg  125 mcg Oral Daily Naheed Ora, APRN - CNP   125 mcg at 21 3914    hydrALAZINE (APRESOLINE) tablet 25 mg  25 mg Oral 3 times per day Naheed Ora, APRN - CNP   25 mg at 21 0501    sodium chloride flush 0.9 % injection 5-40 mL  5-40 mL Intravenous 2 times per day Chelsea Shivers, APRN - CNP   10 mL at 21 0852    sodium chloride flush 0.9 % injection 5-40 mL  5-40 mL Intravenous PRN Chelsea Shivers, APRN - CNP        0.9 % sodium chloride infusion  25 mL Intravenous PRN Chelsea Shivers, APRN - CNP        promethazine (PHENERGAN) tablet 12.5 mg  12.5 mg Oral Q6H PRN Chelsea Shivers, APRN - CNP        Or    ondansetron (ZOFRAN) injection 4 mg  4 mg Intravenous Q6H PRN Chelsea Shivers, APRN - CNP        polyethylene glycol (GLYCOLAX) packet 17 g  17 g Oral Daily PRN Chelsea Shivers, APRN - CNP        acetaminophen (TYLENOL) tablet 650 mg  650 mg Oral Q6H PRN Chelsea Shivers, APRN - CNP   650 mg at 211    Or    acetaminophen (TYLENOL) suppository 650 mg  650 mg Rectal Q6H PRN Chelsea Shivers, APRN - CNP        furosemide (LASIX) injection 40 mg  40 mg Intravenous BID Chelsea Shivers, APRN - CNP   40 mg at 21 0852    labetalol (NORMODYNE;TRANDATE) injection 10 mg  10 mg Intravenous Q4H PRN Chelsea Shivers, APRN - CNP   10 mg at 21 1230    enoxaparin (LOVENOX) injection 100 mg  1 mg/kg Subcutaneous BID Michael Haddox, DO   100 mg at 05/19/21 0579    lisinopril (PRINIVIL;ZESTRIL) tablet 10 mg  10 mg Oral Daily Michael Haddox, DO   10 mg at 05/19/21 9658       Allergies:  No Known Allergies    Problem List:    Patient Active Problem List   Diagnosis Code    Hypertension I10    Obesity E66.9    SIRS (systemic inflammatory response syndrome) (HCC) R65.10       Past Medical History:        Diagnosis Date    Diverticulitis     Hypertension     Kidney stone     3-4       Past Surgical History:        Procedure Laterality Date    COLONOSCOPY      COLONOSCOPY  2015    FOOT SURGERY Right        Social History:    Social History     Tobacco Use    Smoking status: Never Smoker    Smokeless tobacco: Never Used   Substance Use Topics    Alcohol use: No                                Counseling given: Not Answered      Vital Signs (Current):   Vitals:    05/19/21 0459 05/19/21 0500 05/19/21 0759 05/19/21 1105   BP: (!) 152/105  (!) 135/94 (!) 166/104   Pulse: 79  71 120   Resp: 16  16 16   Temp: 97.8 °F (36.6 °C)  98.5 °F (36.9 °C) 98 °F (36.7 °C)   TempSrc: Oral  Oral Oral   SpO2: 92%  95% 94%   Weight:  254 lb 11.2 oz (115.5 kg)     Height:                                                  BP Readings from Last 3 Encounters:   05/19/21 (!) 166/104   11/27/19 (!) 162/103   03/02/15 137/83       NPO Status:  mn+, see mar                                                                               BMI:   Wt Readings from Last 3 Encounters:   05/19/21 254 lb 11.2 oz (115.5 kg)   11/27/19 237 lb (107.5 kg)   03/02/15 275 lb (124.7 kg)     Body mass index is 41.11 kg/m².     CBC:   Lab Results   Component Value Date    WBC 9.4 05/19/2021    RBC 5.62 05/19/2021    HGB 15.8 05/19/2021    HCT 48.3 05/19/2021    MCV 85.9 05/19/2021    RDW 15.2 05/19/2021     05/19/2021       CMP:   Lab Results   Component Value Date     05/19/2021    K 4.3 05/19/2021    CL 94 05/19/2021    CO2 30 05/19/2021    BUN 24 05/19/2021    CREATININE 1.2 05/19/2021    GFRAA >60 05/19/2021    GFRAA >60 10/07/2010    AGRATIO 1.3 05/16/2021    LABGLOM >60 05/19/2021    GLUCOSE 121 05/19/2021    PROT 7.4 05/19/2021    PROT 7.6 10/04/2010    CALCIUM 9.3 05/19/2021    BILITOT 1.5 05/19/2021    ALKPHOS 92 05/19/2021    AST 17 05/19/2021    ALT 37 05/19/2021       POC Tests: No results for input(s): POCGLU, POCNA, POCK, POCCL, POCBUN, POCHEMO, POCHCT in the last 72 hours. Coags: No results found for: PROTIME, INR, APTT    HCG (If Applicable): No results found for: PREGTESTUR, PREGSERUM, HCG, HCGQUANT     ABGs: No results found for: PHART, PO2ART, TRZ4AEB, MSX7MSL, BEART, A5HDPERR     Type & Screen (If Applicable):  No results found for: LABABO, LABRH    Drug/Infectious Status (If Applicable):  No results found for: HIV, HEPCAB    COVID-19 Screening (If Applicable):   Lab Results   Component Value Date    COVID19 Not Detected 05/19/2021           Anesthesia Evaluation  Patient summary reviewed no history of anesthetic complications:   Airway: Mallampati: II  TM distance: <3 FB   Neck ROM: full  Mouth opening: > = 3 FB Dental:          Pulmonary:Negative Pulmonary ROS breath sounds clear to auscultation      (-) COPD, asthma, shortness of breath, recent URI, sleep apnea and not a current smoker          Patient did not smoke on day of surgery. Cardiovascular:    (+) hypertension:, dysrhythmias: atrial fibrillation,     (-) pacemaker, past MI, CAD, CABG/stent,  angina and  CHF    ECG reviewed  Rhythm: irregular  Rate: abnormal  Echocardiogram reviewed         Beta Blocker:  Dose within 24 Hrs        PE comment: Af/f   Neuro/Psych:      (-) seizures, TIA and CVA           GI/Hepatic/Renal:   (+) renal disease: kidney stones,      (-) GERD, liver disease, bowel prep and no morbid obesity       Endo/Other:    (+) blood dyscrasia (AC): arthritis:., no malignancy/cancer.     (-) diabetes mellitus, hypothyroidism, hyperthyroidism, no malignancy/cancer               Abdominal:   (+) obese,     Abdomen: soft. Vascular: negative vascular ROS. Anesthesia Plan      TIVA     ASA 3       Induction: intravenous. MIPS: Postoperative opioids intended and Prophylactic antiemetics administered. Anesthetic plan and risks discussed with patient. Plan discussed with CRNA. This pre-anesthesia assessment may be used as a history and physical.    DOS STAFF ADDENDUM:    Pt seen and examined, chart reviewed (including anesthesia, drug and allergy history). No interval changes to history and physical examination. Anesthetic plan, risks, benefits, alternatives, and personnel involved discussed with patient. Patient verbalized an understanding and agrees to proceed.       Malvin Germain MD  May 19, 2021  11:34 AM      Malvin Germain MD   5/19/2021

## 2021-05-19 NOTE — PROGRESS NOTES
Patient back from cath lab. Verified with CMU, patient in NSR. Patient comfortable in bed, VSS.     Vitals:    05/19/21 1325   BP: (!) 138/90   Pulse: 86   Resp: 16   Temp: 98.4 °F (36.9 °C)   SpO2: 94%

## 2021-05-20 ENCOUNTER — APPOINTMENT (OUTPATIENT)
Dept: CARDIAC CATH/INVASIVE PROCEDURES | Age: 46
DRG: 192 | End: 2021-05-20
Payer: MEDICAID

## 2021-05-20 VITALS
RESPIRATION RATE: 16 BRPM | SYSTOLIC BLOOD PRESSURE: 163 MMHG | OXYGEN SATURATION: 95 % | HEIGHT: 66 IN | BODY MASS INDEX: 40.67 KG/M2 | TEMPERATURE: 98 F | HEART RATE: 73 BPM | WEIGHT: 253.1 LBS | DIASTOLIC BLOOD PRESSURE: 98 MMHG

## 2021-05-20 LAB
ALBUMIN SERPL-MCNC: 3.8 G/DL (ref 3.4–5)
ALP BLD-CCNC: 77 U/L (ref 40–129)
ALT SERPL-CCNC: 27 U/L (ref 10–40)
ANION GAP SERPL CALCULATED.3IONS-SCNC: 6 MMOL/L (ref 3–16)
AST SERPL-CCNC: 16 U/L (ref 15–37)
BILIRUB SERPL-MCNC: 1.3 MG/DL (ref 0–1)
BILIRUBIN DIRECT: <0.2 MG/DL (ref 0–0.3)
BILIRUBIN, INDIRECT: ABNORMAL MG/DL (ref 0–1)
BUN BLDV-MCNC: 33 MG/DL (ref 7–20)
CALCIUM SERPL-MCNC: 9.6 MG/DL (ref 8.3–10.6)
CHLORIDE BLD-SCNC: 100 MMOL/L (ref 99–110)
CO2: 31 MMOL/L (ref 21–32)
CREAT SERPL-MCNC: 1.2 MG/DL (ref 0.9–1.3)
GFR AFRICAN AMERICAN: >60
GFR NON-AFRICAN AMERICAN: >60
GLUCOSE BLD-MCNC: 113 MG/DL (ref 70–99)
HCT VFR BLD CALC: 46.7 % (ref 40.5–52.5)
HEMOGLOBIN: 15.3 G/DL (ref 13.5–17.5)
MAGNESIUM: 2.3 MG/DL (ref 1.8–2.4)
MCH RBC QN AUTO: 28.3 PG (ref 26–34)
MCHC RBC AUTO-ENTMCNC: 32.7 G/DL (ref 31–36)
MCV RBC AUTO: 86.7 FL (ref 80–100)
PDW BLD-RTO: 15.2 % (ref 12.4–15.4)
PHOSPHORUS: 3.8 MG/DL (ref 2.5–4.9)
PLATELET # BLD: 309 K/UL (ref 135–450)
PMV BLD AUTO: 6.8 FL (ref 5–10.5)
POTASSIUM SERPL-SCNC: 4.6 MMOL/L (ref 3.5–5.1)
PRO-BNP: 393 PG/ML (ref 0–124)
RBC # BLD: 5.39 M/UL (ref 4.2–5.9)
SODIUM BLD-SCNC: 137 MMOL/L (ref 136–145)
TOTAL PROTEIN: 7.5 G/DL (ref 6.4–8.2)
WBC # BLD: 8.8 K/UL (ref 4–11)

## 2021-05-20 PROCEDURE — 4A023N8 MEASUREMENT OF CARDIAC SAMPLING AND PRESSURE, BILATERAL, PERCUTANEOUS APPROACH: ICD-10-PCS | Performed by: INTERNAL MEDICINE

## 2021-05-20 PROCEDURE — 99152 MOD SED SAME PHYS/QHP 5/>YRS: CPT

## 2021-05-20 PROCEDURE — 83735 ASSAY OF MAGNESIUM: CPT

## 2021-05-20 PROCEDURE — 6370000000 HC RX 637 (ALT 250 FOR IP): Performed by: INTERNAL MEDICINE

## 2021-05-20 PROCEDURE — 2500000003 HC RX 250 WO HCPCS

## 2021-05-20 PROCEDURE — 84100 ASSAY OF PHOSPHORUS: CPT

## 2021-05-20 PROCEDURE — 80076 HEPATIC FUNCTION PANEL: CPT

## 2021-05-20 PROCEDURE — 85027 COMPLETE CBC AUTOMATED: CPT

## 2021-05-20 PROCEDURE — 80048 BASIC METABOLIC PNL TOTAL CA: CPT

## 2021-05-20 PROCEDURE — C1887 CATHETER, GUIDING: HCPCS

## 2021-05-20 PROCEDURE — 6360000002 HC RX W HCPCS

## 2021-05-20 PROCEDURE — 85347 COAGULATION TIME ACTIVATED: CPT

## 2021-05-20 PROCEDURE — 2709999900 HC NON-CHARGEABLE SUPPLY

## 2021-05-20 PROCEDURE — B2111ZZ FLUOROSCOPY OF MULTIPLE CORONARY ARTERIES USING LOW OSMOLAR CONTRAST: ICD-10-PCS | Performed by: INTERNAL MEDICINE

## 2021-05-20 PROCEDURE — 6360000004 HC RX CONTRAST MEDICATION

## 2021-05-20 PROCEDURE — 2580000003 HC RX 258: Performed by: NURSE PRACTITIONER

## 2021-05-20 PROCEDURE — 36415 COLL VENOUS BLD VENIPUNCTURE: CPT

## 2021-05-20 PROCEDURE — 83880 ASSAY OF NATRIURETIC PEPTIDE: CPT

## 2021-05-20 PROCEDURE — 99153 MOD SED SAME PHYS/QHP EA: CPT

## 2021-05-20 PROCEDURE — 6370000000 HC RX 637 (ALT 250 FOR IP): Performed by: NURSE PRACTITIONER

## 2021-05-20 PROCEDURE — 93460 R&L HRT ART/VENTRICLE ANGIO: CPT

## 2021-05-20 PROCEDURE — C1769 GUIDE WIRE: HCPCS

## 2021-05-20 PROCEDURE — 93460 R&L HRT ART/VENTRICLE ANGIO: CPT | Performed by: INTERNAL MEDICINE

## 2021-05-20 PROCEDURE — C1894 INTRO/SHEATH, NON-LASER: HCPCS

## 2021-05-20 RX ORDER — ACETAMINOPHEN 325 MG/1
650 TABLET ORAL EVERY 4 HOURS PRN
Status: DISCONTINUED | OUTPATIENT
Start: 2021-05-20 | End: 2021-05-20 | Stop reason: HOSPADM

## 2021-05-20 RX ORDER — MIDAZOLAM HYDROCHLORIDE 5 MG/ML
INJECTION INTRAMUSCULAR; INTRAVENOUS
Status: COMPLETED | OUTPATIENT
Start: 2021-05-20 | End: 2021-05-20

## 2021-05-20 RX ORDER — SODIUM CHLORIDE 9 MG/ML
25 INJECTION, SOLUTION INTRAVENOUS PRN
Status: DISCONTINUED | OUTPATIENT
Start: 2021-05-20 | End: 2021-05-20 | Stop reason: HOSPADM

## 2021-05-20 RX ORDER — LISINOPRIL 20 MG/1
20 TABLET ORAL DAILY
Qty: 30 TABLET | Refills: 0 | Status: SHIPPED | OUTPATIENT
Start: 2021-05-21 | End: 2021-06-01

## 2021-05-20 RX ORDER — METOPROLOL SUCCINATE 50 MG/1
100 TABLET, EXTENDED RELEASE ORAL 2 TIMES DAILY
Status: DISCONTINUED | OUTPATIENT
Start: 2021-05-20 | End: 2021-05-20 | Stop reason: HOSPADM

## 2021-05-20 RX ORDER — HEPARIN SODIUM 1000 [USP'U]/ML
INJECTION, SOLUTION INTRAVENOUS; SUBCUTANEOUS
Status: COMPLETED | OUTPATIENT
Start: 2021-05-20 | End: 2021-05-20

## 2021-05-20 RX ORDER — FENTANYL CITRATE 50 UG/ML
INJECTION, SOLUTION INTRAMUSCULAR; INTRAVENOUS
Status: COMPLETED | OUTPATIENT
Start: 2021-05-20 | End: 2021-05-20

## 2021-05-20 RX ORDER — METOPROLOL SUCCINATE 100 MG/1
100 TABLET, EXTENDED RELEASE ORAL 2 TIMES DAILY
Qty: 60 TABLET | Refills: 0 | Status: SHIPPED | OUTPATIENT
Start: 2021-05-20 | End: 2021-06-21 | Stop reason: SDUPTHER

## 2021-05-20 RX ORDER — SPIRONOLACTONE 25 MG/1
25 TABLET ORAL DAILY
Qty: 30 TABLET | Refills: 0 | Status: SHIPPED | OUTPATIENT
Start: 2021-05-21 | End: 2021-06-21 | Stop reason: SDUPTHER

## 2021-05-20 RX ORDER — FUROSEMIDE 40 MG/1
40 TABLET ORAL DAILY
Qty: 30 TABLET | Refills: 0 | Status: SHIPPED | OUTPATIENT
Start: 2021-05-21 | End: 2021-06-01

## 2021-05-20 RX ORDER — SODIUM CHLORIDE 0.9 % (FLUSH) 0.9 %
5-40 SYRINGE (ML) INJECTION PRN
Status: DISCONTINUED | OUTPATIENT
Start: 2021-05-20 | End: 2021-05-20 | Stop reason: HOSPADM

## 2021-05-20 RX ORDER — SODIUM CHLORIDE 0.9 % (FLUSH) 0.9 %
5-40 SYRINGE (ML) INJECTION EVERY 12 HOURS SCHEDULED
Status: DISCONTINUED | OUTPATIENT
Start: 2021-05-20 | End: 2021-05-20 | Stop reason: HOSPADM

## 2021-05-20 RX ORDER — SPIRONOLACTONE 25 MG/1
25 TABLET ORAL DAILY
Status: DISCONTINUED | OUTPATIENT
Start: 2021-05-20 | End: 2021-05-20 | Stop reason: HOSPADM

## 2021-05-20 RX ORDER — FUROSEMIDE 40 MG/1
40 TABLET ORAL DAILY
Status: DISCONTINUED | OUTPATIENT
Start: 2021-05-20 | End: 2021-05-20 | Stop reason: HOSPADM

## 2021-05-20 RX ADMIN — HEPARIN SODIUM 5000 UNITS: 1000 INJECTION, SOLUTION INTRAVENOUS; SUBCUTANEOUS at 10:29

## 2021-05-20 RX ADMIN — ASPIRIN 81 MG: 81 TABLET, CHEWABLE ORAL at 08:33

## 2021-05-20 RX ADMIN — MIDAZOLAM HYDROCHLORIDE 1 MG: 5 INJECTION INTRAMUSCULAR; INTRAVENOUS at 10:29

## 2021-05-20 RX ADMIN — LISINOPRIL 10 MG: 10 TABLET ORAL at 00:15

## 2021-05-20 RX ADMIN — SPIRONOLACTONE 25 MG: 25 TABLET ORAL at 11:14

## 2021-05-20 RX ADMIN — FUROSEMIDE 40 MG: 40 TABLET ORAL at 11:14

## 2021-05-20 RX ADMIN — MIDAZOLAM HYDROCHLORIDE 2 MG: 5 INJECTION INTRAMUSCULAR; INTRAVENOUS at 10:04

## 2021-05-20 RX ADMIN — ATORVASTATIN CALCIUM 40 MG: 40 TABLET, FILM COATED ORAL at 00:15

## 2021-05-20 RX ADMIN — FENTANYL CITRATE 50 MCG: 50 INJECTION, SOLUTION INTRAMUSCULAR; INTRAVENOUS at 10:29

## 2021-05-20 RX ADMIN — METOPROLOL SUCCINATE 50 MG: 50 TABLET, EXTENDED RELEASE ORAL at 08:33

## 2021-05-20 RX ADMIN — LISINOPRIL 20 MG: 20 TABLET ORAL at 08:33

## 2021-05-20 RX ADMIN — HYDRALAZINE HYDROCHLORIDE 25 MG: 25 TABLET, FILM COATED ORAL at 05:43

## 2021-05-20 RX ADMIN — Medication 10 ML: at 08:33

## 2021-05-20 RX ADMIN — FENTANYL CITRATE 25 MCG: 50 INJECTION, SOLUTION INTRAMUSCULAR; INTRAVENOUS at 10:04

## 2021-05-20 ASSESSMENT — PAIN SCALES - GENERAL
PAINLEVEL_OUTOF10: 0
PAINLEVEL_OUTOF10: 0

## 2021-05-20 NOTE — PROCEDURES
CARDIAC CATHETERIZATION REPORT    Date of Procedure: 5/20/2021  : Laurin Osler, DO  Primary Indication: Dyspnea, acute heart failure, cardiomyopathy    Procedures Performed:  1. Coronary angiography  2. Left heart catheterization  3. Right heart catheterization  4. Moderate conscious sedation    Procedural Details:  1. Access: Local anesthetic was given and access was obtained in the right radial artery using a micropuncture technique and a 6F Terumo Slender Sheath was placed without difficulty. A 5F sheath was placed in the right brachial vein via wire exchange of an existing IV. 2. Diagnostic: A 5F Annitta Randy catheter was used to perform the right heart catheterization. A 5F TIG catheter was used to perform selective right and left coronary angiography. The 5F TIG catheter was also used to perform the left heart catheterization. No significant gradient was observed on pull-back of the catheter across the aortic valve. 3. Hemostasis: At the end of the procedure, the radial sheath was removed and a hemoband was placed over the arteriotomy site and filled with air to maintain hemostasis. The venous sheath was removed and manual pressure applied to maintain hemostasis. Findings:  1. Hemodynamics:   A. Right heart catheterization                   1. RA: 11 mmHg                   2. RV: 45/11 mmHg                   3. PA: 44/24 (30) mmHg                   4. PCWP: 16 mmHg                   5. Saturations: RA 70%, PA 68%, AO 90%                   6. Lilliam CO: 7.06 L/min                   7. Lilliam CI: 3.2 L/min*m2                   8. Lilliam SVR: 1,156 dyne*sec/cm5                   9. Lilliam PVR: 159 dyne*sec/cm5     B. Opening arterial pressure: 140/91 (113) mmHg      C. LVEDP: 17 mmHg    2. Coronary anatomy:  A. Left main artery: The left main artery bifurcates into the left anterior descending artery and left circumflex artery.   The left main artery is short but otherwise has no angiographically significant disease. B. Left anterior descending artery: Transapical vessel which gives rise to 2 diagonal arteries. The LAD  has no angiographically significant disease. .  The diagonal arteries have no angiographically significant disease. C. Left circumflex artery: Non-dominant vessel that gives rise to 3 obtuse marginal arteries and 2 posterolateral branches. The LCx has no angiographically significant disease. The OM1 is a small vessel with no angiographically significant disease. The OM2 is a large vessel with no angiographically significant disease. The OM3 is a small vessel with no angiographically significant disease. The left posterolateral branches have no angiographically significant disease. D. Right coronary artery: Dominant vessel. The RCA has no angiographically significant disease. The posterior descending artery has no angiographically significant disease. Technical Factors:  Complications: None. Estimated blood loss: Minimal.  Radiation: Air kerma 586 mGy and 4.3 minutes of fluoroscopy  Sedation: Moderate conscious sedation was administered by qualified nursing personnel under continuous hemodynamic monitoring, starting at 10:00 AM and ending at 10:35 AM.  Medications: 2.5 mg IA verapamil, 3 mg IV Versed, 75 mcg IV Fentanyl, 5,000 units IV heparin  Contrast: 60 cc of Optiray    Impression:  1. Normal coronary arteries. 2. Non-ischemic cardiomyopathy, likely tachycardia induced in setting of atrial flutter s/p successful DCCV earlier this admission. 3. LV systolic heart failure with mildly elevated left-sided cardiac filling pressures and moderately elevated right-sided cardiac filling pressures. 4. Mild pulmonary hypertension. 5. Normal Lilliam cardiac output and index. Plan:  1. Start Eliquis 5 mg BID this evening. 2. OK to discontinue aspirin and statin. 3. Transition from IV to PO lasix 40 mg daily. 4. Discontinue hydralazine and start spironolactone 25 mg daily.   5. Increase metoprolol succinate to 100 mg BID. 6. Continue lisinopril 20 mg daily. 7. Low salt diet. 8. Will arrange for 2-week Medi-Lynx extended Holter monitor at discharge to further evaluate atrial flutter burden. 9. Follow-up with [facility] in 1-2 weeks.       Bharti Nair, [address]

## 2021-05-20 NOTE — PROGRESS NOTES
Hospitalist Progress Note      PCP: No primary care provider on file. Date of Admission: 5/16/2021    Chief Complaint: Shortness of breath and hypertension    Subjective: No new c/o. Medications:  Reviewed    Infusion Medications    sodium chloride       Scheduled Medications    lisinopril  20 mg Oral Daily    aspirin  81 mg Oral Daily    atorvastatin  40 mg Oral Nightly    metoprolol succinate  50 mg Oral BID    hydrALAZINE  25 mg Oral 3 times per day    sodium chloride flush  5-40 mL Intravenous 2 times per day    enoxaparin  1 mg/kg Subcutaneous BID     PRN Meds: meperidine, HYDROmorphone, HYDROmorphone, morphine, morphine, sodium chloride flush, sodium chloride, promethazine **OR** ondansetron, polyethylene glycol, acetaminophen **OR** acetaminophen, labetalol      Intake/Output Summary (Last 24 hours) at 5/20/2021 0904  Last data filed at 5/19/2021 2347  Gross per 24 hour   Intake 630 ml   Output 2195 ml   Net -1565 ml       Physical Exam Performed:    BP (!) 165/112   Pulse 84   Temp 97.6 °F (36.4 °C) (Oral)   Resp 16   Ht 5' 6\" (1.676 m)   Wt 253 lb 1.6 oz (114.8 kg)   SpO2 96%   BMI 40.85 kg/m²     General appearance: No apparent distress, appears stated age and cooperative. HEENT: Pupils equal, round, and reactive to light. Conjunctivae/corneas clear. Neck: Supple, with full range of motion. No jugular venous distention. Trachea midline. Respiratory:  Normal respiratory effort. Clear to auscultation, bilaterally without Rales/Wheezes/Rhonchi. Cardiovascular: Regular rate and rhythm with normal S1/S2 without murmurs, rubs or gallops. Abdomen: Soft, non-tender, non-distended with normal bowel sounds. Musculoskeletal: No clubbing, cyanosis w/ 1+ LE edema bilaterally. Full range of motion without deformity. Skin: Skin color, texture, turgor normal.  No rashes or lesions. Neurologic:  Neurovascularly intact without any focal sensory/motor deficits.  Cranial nerves: II-XII intact, grossly non-focal.  Psychiatric: Alert and oriented, thought content appropriate, normal insight  Capillary Refill: Brisk,< 3 seconds   Peripheral Pulses: +2 palpable, equal bilaterally       Labs:   Recent Labs     05/18/21  0740 05/19/21  0652 05/20/21  0824   WBC 10.4 9.4 8.8   HGB 14.8 15.8 15.3   HCT 45.8 48.3 46.7    332 309     Recent Labs     05/18/21  0740 05/19/21  0652 05/20/21  0824    137 137   K 4.5 4.3 4.6   CL 98* 94* 100   CO2 29 30 31   BUN 19 24* 33*   CREATININE 1.2 1.2 1.2   CALCIUM 9.5 9.3 9.6   PHOS  --   --  3.8     Recent Labs     05/18/21  0740 05/19/21  0652 05/20/21  0824   AST 24 17 16   ALT 52* 37 27   BILIDIR 0.3 0.3 <0.2   BILITOT 1.7* 1.5* 1.3*   ALKPHOS 85 92 77     No results for input(s): INR in the last 72 hours. No results for input(s): Mariela Castor in the last 72 hours. Urinalysis:      Lab Results   Component Value Date    NITRU Negative 05/17/2021    BLOODU Negative 05/17/2021    SPECGRAV 1.015 05/17/2021    GLUCOSEU Negative 05/17/2021       Consults:    IP CONSULT TO CARDIOLOGY  IP CONSULT TO CASE MANAGEMENT  IP CONSULT TO FINANCIAL COUNSELOR      Assessment/Plan:    Active Hospital Problems    Diagnosis     Atrial flutter ([facility] [I48.92]     Acute systolic heart failure ([facility] [I50.21]     Cardiomyopathy ([facility] [I42.9]     SIRS (systemic inflammatory response syndrome) ([facility] [R65.10]     Uncontrolled hypertension [I10]     Obesity [E66.9]        CHF - acute systolic failure w/ reduced EF 30% by Echo dated 18 May. Likely due to hypertensive heart disease but may be rate related failure. Eventually ischemic etiology will have to be ruled out w/ future RHC/LHC planned when euvolemic. Continue diuresis as currently ordered and follow I/O as well as clinical response. CXR w/ cardiomegaly/vascular congestion w/ elevated BNP. Cardiology consulted and appreciated    Aflutter - on BBlocker w/ Dig d/c'd post DCCV. Tx dose LMWH.   S/P LINDSAY/DCCV Wed 19 May w/out complications. Goal Mag++ >2 and K+ >4. HTN - w/out known CAD and no evidence of active signs/sxs of ischemia/failure. Uncontrolled on admission. Currently improved on BBlocker meds w/ vitals reviewed and documented as above.       Acute transaminitis - likely 2nd to above. Will continue to follow serial labs. Reviewed and documented as above.      Anemia - etiology clinically unable to determine, w/out evidence of active bleeding/hemolysis. Stable and asymptomatic w/out indication for transfusion. Follow serial labs. Reviewed and documented as above. Morbid Obesity -  With Body mass index is 40.85 kg/m². Complicating assessment and treatment. Placing patient at risk for multiple co-morbidities as well as early death and contributing to the patient's presentation. Counseled on weight loss.        DVT Prophylaxis: Tx dose LMWH     Recent Labs     05/18/21  0740 05/19/21  0652 05/20/21  0824    332 309     Diet: Diet NPO Time Specified Exceptions are: Sips with Meds  Code Status: Full Code      PT/OT Eval Status: not ordered. Dispo - Possibly Thurs/Friday 20/21 May pending clinical course and subspecialty recs re: timing of RHC/LHCath.       Abhay Moss MD

## 2021-05-20 NOTE — PROGRESS NOTES
Discharge instructions reviewed with pt; all questions answered. PIV removed. Telemetry monitor removed; [facility] notified. Pt discharged home via private vehicle with all belongings.

## 2021-05-20 NOTE — PROGRESS NOTES
Per Dr. Myke Ray event monitor will be addressed at the patient's OV due to patient's pending Medicaid. Patient's nurse Fiorella Dow made aware.

## 2021-05-20 NOTE — ED PROVIDER NOTES
[address] [facility]  EMERGENCY DEPARTMENTENCOUNTER      Pt Name: Chapin Cerrato  MRN: [id number]  [place] 1975  Date ofevaluation: 5/16/2021  Provider: Reva Sicard, MD    CHIEF COMPLAINT       Chief Complaint   Patient presents with    Hypertension     Pt states has had HTN for the past couple of weeks. Verbalizes dizzines, headaches, blurred vision, and SOB for the duration of HTN. States that he is not taking medication for HTN and has a hx of prehtn         HISTORY OF PRESENT ILLNESS   (Location/Symptom, Timing/Onset,Context/Setting, Quality, Duration, Modifying Factors, Severity)  Note limiting factors. Chapin Cerrato is a 51 y.o. male  who  has a past medical history of Diverticulitis, Hypertension, and Kidney stone. who presents to the emergency department for evaluation of hypertension, headaches shortness of breath and generalized fatigue. Patient reports that he has a history of prehypertension does not been taking medications. He states that for the past few weeks he is noticed his blood pressure has been elevated to the 190s to 373 range systolically. He states that when his blood pressure is elevated he has blurred vision and headaches. He does not have any headaches currently. He does report having exertional dyspnea orthopnea bilateral lower extremity swelling. He states he is never had the symptoms before. He denies fevers or cough. Denies close contact with sick persons. He has had his COVID-19 vaccination. Denies nausea vomiting or changes in bowel or urine function. Denies paresthesias numbness or weakness. Patient currently asymptomatic but is noted to be hypertensive and tachycardic on arrival to the ED. HPI    NursingNotes were reviewed. REVIEW OF SYSTEMS    (2-9 systems for level 4, 10 or more for level 5)     Review of Systems   Constitutional: Positive for fatigue. Negative for activity change, chills and fever.    HENT: Negative for congestion and rhinorrhea. Eyes: Positive for visual disturbance. Negative for photophobia. Respiratory: Positive for shortness of breath. Negative for wheezing. Cardiovascular: Positive for leg swelling. Negative for chest pain and palpitations. Gastrointestinal: Negative for abdominal pain, nausea and vomiting. Endocrine: Negative for polydipsia and polyuria. Genitourinary: Negative for decreased urine volume, difficulty urinating and frequency. Musculoskeletal: Negative for back pain, gait problem, neck pain and neck stiffness. Skin: Negative for color change and rash. Neurological: Positive for headaches. Negative for dizziness, weakness, light-headedness and numbness. Psychiatric/Behavioral: Negative for confusion. The patient is not nervous/anxious. All other systems reviewed and are negative. Except as noted above the remainder of the review of systems was reviewed and negative. PAST MEDICAL HISTORY     Past Medical History:   Diagnosis Date    Diverticulitis     Hypertension     Kidney stone     3-4         SURGICALHISTORY       Past Surgical History:   Procedure Laterality Date    COLONOSCOPY      COLONOSCOPY  2015    FOOT SURGERY Right          CURRENT MEDICATIONS     There are no discharge medications for this patient. Patient has no known allergies.     FAMILY HISTORY       Family History   Problem Relation Age of Onset    High Blood Pressure Mother     Diabetes Mother     Diabetes Maternal Grandmother           SOCIAL HISTORY       Social History     Socioeconomic History    Marital status:      Spouse name: None    Number of children: None    Years of education: None    Highest education level: None   Occupational History    None   Tobacco Use    Smoking status: Never Smoker    Smokeless tobacco: Never Used   Vaping Use    Vaping Use: Never used   Substance and Sexual Activity    Alcohol use: No    Drug use: Never    Sexual activity: None Other Topics Concern    None   Social History Narrative    None     Social Determinants of Health     Financial Resource Strain:     Difficulty of Paying Living Expenses:    Food Insecurity:     Worried About Running Out of Food in the Last Year:     [address] in the Last Year:    Transportation Needs:     Lack of Transportation (Medical):  Lack of Transportation (Non-Medical):    Physical Activity:     Days of Exercise per Week:     Minutes of Exercise per Session:    Stress:     Feeling of Stress :    Social Connections:     Frequency of Communication with Friends and Family:     Frequency of Social Gatherings with Friends and Family:     Attends [employer] Services:     Active Member of Clubs or Organizations:     Attends Club or Organization Meetings:     Marital Status:    Intimate Partner Violence:     Fear of Current or Ex-Partner:     Emotionally Abused:     Physically Abused:     Sexually Abused:        SCREENINGS    Marcellus Coma Scale  Eye Opening: Spontaneous  Best Verbal Response: Oriented  Best Motor Response: Obeys commands  Marcellus Coma Scale Score: 15        PHYSICAL EXAM    (up to 7 for level 4, 8 or more for level 5)     ED Triage Vitals [05/16/21 2249]   BP Temp Temp Source Pulse Resp SpO2 Height Weight   (!) 201/149 99.3 °F (37.4 °C) Temporal 139 18 94 % 5' 6\" (1.676 m) 230 lb (104.3 kg)       Physical Exam  Vitals and nursing note reviewed. Constitutional:       General: He is not in acute distress. Appearance: He is well-developed. He is diaphoretic. He is not toxic-appearing. HENT:      Head: Normocephalic and atraumatic. Mouth/Throat:      Mouth: Mucous membranes are moist.      Pharynx: No oropharyngeal exudate. Eyes:      Extraocular Movements: Extraocular movements intact. Conjunctiva/sclera: Conjunctivae normal.      Pupils: Pupils are equal, round, and reactive to light. Neck:      Trachea: No tracheal deviation.    Cardiovascular: Rate and Rhythm: Regular rhythm. Tachycardia present. Pulmonary:      Effort: Pulmonary effort is normal.      Breath sounds: Normal breath sounds. No wheezing or rales. Abdominal:      General: There is no distension. Palpations: Abdomen is soft. Tenderness: There is no abdominal tenderness. There is no guarding or rebound. Musculoskeletal:         General: No deformity. Normal range of motion. Cervical back: Normal range of motion. Skin:     General: Skin is warm. Neurological:      General: No focal deficit present. Mental Status: He is alert and oriented to person, place, and time. Cranial Nerves: No cranial nerve deficit. Sensory: No sensory deficit. Motor: No weakness. Gait: Gait normal.         RESULTS     EKG: All EKG's are interpreted by the Emergency Department Physician who either signs or Co-signsthis chart in the absence of a cardiologist.     EKG shows sinus rhythm with a ventricular rate of 140 bpm.  Patient's FL interval and QTc interval within normal limits. Patient has normal axis. There are no significant ST elevations or depressions EKG is nondiagnostic for ACS. There is no previous EKG to compare to. RADIOLOGY:   Non-plain filmimages such as CT, Ultrasound and MRI are read by the radiologist. Plain radiographic images are visualized and preliminarily interpreted by the emergency physician with the below findings:      Interpretation per the Radiologist below, if available at the time ofthis note:    XR CHEST (2 VW)   Final Result   Mild cardiomegaly and mild central pulmonary congestion which is more   prominent with no obvious infiltrate or effusion.                ED BEDSIDE ULTRASOUND:   Performed by ED Physician - none    LABS:  Labs Reviewed   CBC WITH AUTO DIFFERENTIAL - Abnormal; Notable for the following components:       Result Value    Hemoglobin 12.6 (*)     Hematocrit 38.9 (*)     All other components within normal limits Narrative:     Performed at:  [address] [place] [address] [place]   Phone [phone]   COMPREHENSIVE METABOLIC PANEL - Abnormal; Notable for the following components:    Sodium 135 (*)     Glucose 130 (*)     GFR Non- 59 (*)     Total Protein 6.3 (*)     ALT 66 (*)     AST 42 (*)     All other components within normal limits    Narrative:     Performed at:  [address] [place] [address] [place]   Phone [phone] - Abnormal; Notable for the following components:    Pro-BNP 1,811 (*)     All other components within normal limits    Narrative:     Performed at:  [place] [address] [place]   Phone [phone]   HEPATIC FUNCTION PANEL - Abnormal; Notable for the following components:    ALT 64 (*)     All other components within normal limits    Narrative:     Performed at:  [address] [place] [address] [place]   Phone [phone]   BASIC METABOLIC PANEL - Abnormal; Notable for the following components:    Sodium 134 (*)     Glucose 128 (*)     All other components within normal limits    Narrative:     Performed at:  [address] [place] [address] [place]   Phone [phone]   CBC WITH AUTO DIFFERENTIAL - Abnormal; Notable for the following components:    Hemoglobin 12.8 (*)     Hematocrit 39.5 (*)     All other components within normal limits    Narrative:     Performed at:  [address] [place] [address] [place]   Phone [phone] - Abnormal; Notable for the following components:    Pro-BNP 1,895 (*)     All other components within normal limits    Narrative:     Performed at:  [address] [place], [facility] [email or web address]   Phone [phone]   LIPID PANEL - Abnormal; Notable for the following components:    HDL 34 (*)     All other components within normal limits    Narrative:     Performed at:  [facility]  [address] [facility] [employer] [address]   Phone [phone]   BASIC METABOLIC PANEL - Abnormal; Notable for the following components:    Chloride 98 (*)     Glucose 113 (*)     All other components within normal limits    Narrative:     Performed at:  [address], [facility] [place]   Phone [phone]   LIPID PANEL - Abnormal; Notable for the following components:    HDL 33 (*)     All other components within normal limits    Narrative:     Performed at:  [facility]  [address] [facility] [employer] [address]   Phone [phone]   HEPATIC FUNCTION PANEL - Abnormal; Notable for the following components:    ALT 52 (*)     Total Bilirubin 1.7 (*)     Bilirubin, Indirect 1.4 (*)     All other components within normal limits    Narrative:     Performed at:  [place] [address] [place]   Phone [phone] PANEL - Abnormal; Notable for the following components:    Chloride 94 (*)     Glucose 121 (*)     BUN 24 (*)     All other components within normal limits    Narrative:     Performed at:  [address], [facility] [place]   Phone [phone]   HEPATIC FUNCTION PANEL - Abnormal; Notable for the following components:     Total Bilirubin 1.5 (*)     Bilirubin, Indirect 1.2 (*)     All other components within normal limits    Narrative:     Performed at:  [address], [facility] [employer]   Phone [phone], RAPID    Narrative:     ORDER WAS CANCELLED 05/19/2021 10:33, wrong test ordered for Lucia, Danny [facility] [employer]   Phone [phone]   LACTATE, SEPSIS   BASIC METABOLIC PANEL   MAGNESIUM   BRAIN NATRIURETIC PEPTIDE   HEPATIC FUNCTION PANEL   CBC   PHOSPHORUS       All other labs were within normal range or not returned as of this dictation.     EMERGENCY DEPARTMENT COURSE and DIFFERENTIAL DIAGNOSIS/MDM:   Vitals:    Vitals:    05/19/21 1105 05/19/21 1325 05/19/21 1700 05/19/21 1957   BP: (!) 166/104 (!) 138/90 (!) 140/88 (!) 163/116   Pulse: 120 86 84 89   Resp: 16 16 16 16   Temp: 98 °F (36.7 °C) 98.4 °F (36.9 °C) 98.1 °F (36.7 °C) 98.5 °F (36.9 °C)   TempSrc: Oral Oral Oral Axillary   SpO2: 94% 94% 95% 93%   Weight:       Height:           Patient was given thefollowing medications:  Medications   sodium chloride flush 0.9 % injection 5-40 mL (10 mLs Intravenous Given 5/19/21 0852)   sodium chloride flush 0.9 % injection 5-40 mL (has no administration in time range)   0.9 % sodium chloride infusion (has no administration in time range)   promethazine (PHENERGAN) tablet 12.5 mg (has no administration in time range)     Or   ondansetron (ZOFRAN) injection 4 mg (has no administration in time range)   polyethylene glycol (GLYCOLAX) packet 17 g (has no administration in time range)   acetaminophen (TYLENOL) tablet 650 mg (650 mg Oral Given 5/18/21 2121)     Or   acetaminophen (TYLENOL) suppository 650 mg ( Rectal See Alternative 5/18/21 2121)   furosemide (LASIX) injection 40 mg (40 mg Intravenous Given 5/19/21 1706)   labetalol (NORMODYNE;TRANDATE) injection 10 mg (10 mg Intravenous Given 5/18/21 1230)   enoxaparin (LOVENOX) injection 100 mg (100 mg Subcutaneous Given 5/19/21 0852)   lisinopril (PRINIVIL;ZESTRIL) tablet 10 mg (10 mg Oral Given 5/19/21 0851)   metoprolol succinate (TOPROL XL) extended release tablet 50 mg (50 mg Oral Given 5/19/21 0851)   hydrALAZINE (APRESOLINE) tablet 25 mg (25 mg Oral Given 5/19/21 1193)   meperidine (DEMEROL) injection 12.5 mg (has no administration in time range) HYDROmorphone (DILAUDID) injection 0.25 mg (has no administration in time range)   HYDROmorphone (DILAUDID) injection 0.5 mg (has no administration in time range)   morphine (PF) injection 1 mg (has no administration in time range)   morphine (PF) injection 2 mg (has no administration in time range)   oxyCODONE-acetaminophen (PERCOCET) 5-325 MG per tablet 1 tablet (has no administration in time range)     Or   oxyCODONE-acetaminophen (PERCOCET) 5-325 MG per tablet 2 tablet (has no administration in time range)   ondansetron (ZOFRAN) injection 4 mg (has no administration in time range)   0.9 % sodium chloride bolus (0 mLs Intravenous Stopped 5/17/21 0047)   labetalol (NORMODYNE;TRANDATE) injection 10 mg (10 mg Intravenous Given 5/16/21 2355)   furosemide (LASIX) injection 40 mg (40 mg Intravenous Given 5/17/21 0047)   labetalol (NORMODYNE;TRANDATE) injection 10 mg (10 mg Intravenous Given 5/17/21 0148)   perflutren lipid microspheres (DEFINITY) injection 1.65 mg (1.65 mg Intravenous Given 5/18/21 0644)   potassium chloride (KLOR-CON M) extended release tablet 40 mEq (40 mEq Oral Given 5/17/21 1231)   digoxin (LANOXIN) injection 500 mcg (500 mcg Intravenous Given 5/17/21 1231)   metoprolol succinate (TOPROL XL) extended release tablet 25 mg (25 mg Oral Given 5/17/21 1231)   digoxin (LANOXIN) injection 250 mcg (250 mcg Intravenous Given 5/17/21 2041)   famotidine (PEPCID) injection 20 mg (20 mg Intravenous Given 5/19/21 1154)       ED COURSE & MEDICAL DECISION MAKING    Pertinent Labs & Imaging studies reviewed. (See chart for details)   -  Patient seen and evaluated in the emergency department. -  Triage and nursing notes reviewed and incorporated. -  Old chart records reviewed and incorporated.   -  Differential diagnosis includes: Differential diagnosis: Asymptomatic hypertension, hypertensive urgency, hypertensive emergency, hypertension encephalopathy, acute coronary syndrome, acute renal failure, Thrombotic Stroke, Embolic Stroke, Hemorrhagic Stroke, pain or vertigo or other medical problems elevating the blood pressure secondarily which is a normal physiologic response, other    -  Work-up included:  See above  -  ED treatment included: See above  -  Results discussed with patient. Patient presents the ED for evaluation of hypertension. He has had associated orthopnea and exertional dyspnea with bilateral leg swelling. He is noted to be hypertensive and tachycardic on arrival.  Labs show elevated BNP. Troponin within normal limits. Lactate was not elevated. . Imaging studies show pulmonary edema versus possible infiltrate. The patient's BNP appears he may have a touch of heart failure. Patient appears to be in hypertensive urgency due to the elevated blood pressure laboratory and radiographic findings consistent with heart failure. due to the patient's hypertension irregular heartbeat new onset heart failure readmitted the hospital for further medical management evaluation. Patient feels well at time of reevaluation. Result discussed with the patient. .  The patient is agreeable with plan of care and disposition. REASSESSMENT          CRITICAL CARE TIME   Total Critical Care time was 35 minutes, excluding separatelyreportable procedures. There was a high probability ofclinically significant/life threatening deterioration in the patient's condition which required my urgent intervention. CONSULTS:  IP CONSULT TO CARDIOLOGY  IP CONSULT TO CASE MANAGEMENT  IP CONSULT TO FINANCIAL COUNSELOR    PROCEDURES:  Unless otherwise noted below, none     Procedures    FINAL IMPRESSION      1. Hypertensive urgency    2. Elevated brain natriuretic peptide (BNP) level    3.  Tachycardia          DISPOSITION/PLAN   DISPOSITION Admitted 05/17/2021 01:04:29 AM      PATIENT REFERREDTO:  Dudley Randall DO  [address] [place] [address]  [phone]    Go on 5/28/2021  Post hospital follow up appt with Ubaldo Catalan DO at 10:30am      DISCHARGEMEDICATIONS:  There are no discharge medications for this patient.          (Please note that portions of this note were completed with a voice recognition program.  Efforts were made to edit the dictations but occasionally words are mis-transcribed.)    Colonel Adolfo MD (electronically signed)  Attending Emergency Physician          Colonel Adolfo MD  05/19/21 2029

## 2021-05-20 NOTE — PRE SEDATION
personally completed a history, physical exam & review of systems for this patient (see notes). Prior History of Anesthesia Complications:   none    Modified Mallampati:  III (soft palate, base of uvula visible)    ASA Classification:  Class 3 - A patient with severe systemic disease that limits activity but is not incapacitating      Sukumar Scale: Activity:  2 - Able to move 4 extremities voluntarily on command  Respiration:  2 - Able to breathe deeply and cough freely  Circulation:  2 - BP+/- 20mmHg of normal  Consciousness:  2 - Fully awake  Oxygen Saturation (color):  2 - Able to maintain oxygen saturation >92% on room air    Sedation/Anesthesia Plan:  Guard the patient's safety and welfare. Minimize physical discomfort and pain. Minimize negative psychological responses to treatment by providing sedation and analgesia and maximize the potential amnesia. Patient to meet pre-procedure discharge plan.     Medication Planned:  midazolam intravenously and fentanyl intravenously    Patient is an appropriate candidate for plan of sedation: yes      Electronically signed by David Julian DO on 5/20/2021 at 9:57 AM

## 2021-05-20 NOTE — PROGRESS NOTES
[facility] [address]   PROGRESS NOTE  [phone]      Attending Physician: Chris Canales MD  Reason for Consultation/Chief Complaint: Atrial flutter, acute heart failure    Subjective     Patient underwent LINDSAY and successful DCCV to sinus rhythm earlier today. He states that he immediately felt better following the procedure and that his shortness of breath has improved significantly. His lower extremity edema continues to improve as well. He received 40 mg IV lasix BID yesterday and was net negative ~3L over the last 24 hours. Weight is 254 lbs today from 259 lbs yesterday. Creatinine remains stable. CURRENT Medications:  meperidine (DEMEROL) injection 12.5 mg, Q5 Min PRN  HYDROmorphone (DILAUDID) injection 0.25 mg, Q5 Min PRN  HYDROmorphone (DILAUDID) injection 0.5 mg, Q5 Min PRN  morphine (PF) injection 1 mg, Q5 Min PRN  morphine (PF) injection 2 mg, Q5 Min PRN  oxyCODONE-acetaminophen (PERCOCET) 5-325 MG per tablet 1 tablet, PRN   Or  oxyCODONE-acetaminophen (PERCOCET) 5-325 MG per tablet 2 tablet, PRN  ondansetron (ZOFRAN) injection 4 mg, Once PRN  metoprolol succinate (TOPROL XL) extended release tablet 50 mg, BID  hydrALAZINE (APRESOLINE) tablet 25 mg, 3 times per day  sodium chloride flush 0.9 % injection 5-40 mL, 2 times per day  sodium chloride flush 0.9 % injection 5-40 mL, PRN  0.9 % sodium chloride infusion, PRN  promethazine (PHENERGAN) tablet 12.5 mg, Q6H PRN   Or  ondansetron (ZOFRAN) injection 4 mg, Q6H PRN  polyethylene glycol (GLYCOLAX) packet 17 g, Daily PRN  acetaminophen (TYLENOL) tablet 650 mg, Q6H PRN   Or  acetaminophen (TYLENOL) suppository 650 mg, Q6H PRN  furosemide (LASIX) injection 40 mg, BID  labetalol (NORMODYNE;TRANDATE) injection 10 mg, Q4H PRN  enoxaparin (LOVENOX) injection 100 mg, BID  lisinopril (PRINIVIL;ZESTRIL) tablet 10 mg, Daily        Allergies:  Patient has no known allergies. Review of Systems:   General: No chills or sweats.   Cardiac: Cardiac Data     TTE 5/16/21:  Conclusions   Summary   Technically difficult examination. Left ventricular function and wall motion is difficult to estimate due to   poor endocardial visualization. Moderate LV dysfunction with global hypokinesis, EF 30%   Normal left ventricular size with moderate concentric left ventricular   hypertrophy. Assessment     Butch Domingo. Miriam Lu is a 51 y.o. male with a history of morbid obesity, essential hypertension, and nephrolithiasis admitted with acute heart failure, hypertensive urgency, and newly diagnosed atrial flutter.     1. Acute LV systolic heart failure/cardiomyopathy - Acute heart failure likely precipitated by combination of atrial flutter and hypertensive urgency. LVEF 30% on TTE with global hypokinesis. Cardiomyopathy may be tachycardia induced, but patient does have risk factors for CAD and has never had formal ischemic evaluation. Continues to diurese well on IV lasix, but is still clinically mildly hypervolemic. Weight is down 11 lbs since admission. 2. Persistent atrial flutter - S/p LINDSAY and successful DCCV to sinus rhythm earlier today. [id number] is at least 2.  3. Uncontrolled hypertension - BP continues to run above goal.  4. Morbid obesity  5. H/o nephrolithiasis    Plan     1. Keep NPO after midnight and will arrange for invasive coronary angiography/LHC/RHC tomorrow for definitive evaluation of coronary anatomy and intravascular hemodynamics. 2. Continue to diurese with IV lasix 40 mg BID. 3. Continue SC Lovenox 1 mg/kg and will plan to transition to [address] following catheterization. 4. Start aspirin 81 mg daily and atorvastatin 40 mg daily. 5. OK to discontinue digoxin. 6. Increase lisinopril to 20 mg daily and continue to titrate as needed. 7. Continue metoprolol succinate 50 mg daily and hydralazine 25 mg TID. 8. Continue to monitor on telemetry while inpatient. 9. Strict I/Os, daily standing weights.   10. 1.5L per day fluid restriction, low sodium diet. 11. Trend renal panels and replete electrolytes as needed to maintain goal K>4 and Mg>2. Thank you for allowing us to participate in the care of Oneida Aguiar. Please call me with any questions [phone]. DO Evelyne Lombardo  [phone] [facility]  [phone] [facility]  5/19/2021 8:50 PM    I will address the patient's cardiac risk factors and adjusted pharmacologic treatment as needed. In addition, I have reinforced the need for patient directed risk factor modification. All questions and concerns were addressed to the patient/family. Alternatives to my treatment were discussed. The note was completed using EMR. Every effort was made to ensure accuracy; however, inadvertent computerized transcription errors may be present.

## 2021-05-20 NOTE — CARE COORDINATION
CASE MANAGEMENT DISCHARGE SUMMARY    Discharge to: Home    Transportation: via private car    Confirmed discharge plan with: patient     RN, name: Luis Mcdonnell RN     Note: Discharging nurse to complete SHERLY, reconcile AVS, and place final copy with patient's discharge packet. RN to ensure that written prescriptions for  Level II medications are sent with patient to the facility as per protocol.   MARYAM Wright

## 2021-05-20 NOTE — PLAN OF CARE
Problem: Falls - Risk of:  Goal: Will remain free from falls  Description: Will remain free from falls  5/19/2021 2121 by London Chavarria RN  Outcome: Ongoing  Note: Pt will remain free from falls throughout hospital stay. Fall precautions in place, bed in lowest position with wheels locked and side rails 2/4 up. Room door open and hourly rounding completed. Will continue to monitor throughout shift. Problem: OXYGENATION/RESPIRATORY FUNCTION  Goal: Patient will maintain patent airway  5/19/2021 2122 by London Chavarria RN  Outcome: Ongoing  Note:   Patient's EF (Ejection Fraction) is less than 40%    Heart Failure Medications:  Diuretics[de-identified] Furosemide    (One of the following REQUIRED for EF <40%/SYSTOLIC FAILURE but MAY be used in EF% >40%/DIASTOLIC FAILURE)        ACE[de-identified] Lisinopril        ARB[de-identified] None         ARNI[de-identified] None    (Beta Blockers)  NON- Evidenced Based Beta Blocker (for EF% >40%/DIASTOLIC FAILURE): None    Evidenced Based Beta Blocker::(REQUIRED for EF% <40%/SYSTOLIC FAILURE) Metoprolol SUCCinate- Toprol XL  . .................................................................................................................................................. Patient's weights and intake/output reviewed: Yes    Patient's Last Weight: 254 lbs obtained by standing scale. Difference of 5 lbs less than last documented weight. Intake/Output Summary (Last 24 hours) at 5/19/2021 2122  Last data filed at 5/19/2021 1959  Gross per 24 hour   Intake 630 ml   Output 2370 ml   Net -1740 ml       Comorbidities Reviewed Yes    Patient has a past medical history of Diverticulitis, Hypertension, and Kidney stone.      >>For CHF and Comorbidity documentation on Education Time and Topics, please see Education Tab    Progressive Mobility Assessment:  What is this patient's Current Level of Mobility?: Ambulatory-Up Ad Aminta  How was this patient Mobilized today?: Edge of Bed, Up to Chair, Bedside Commode,  Up to Toilet/Shower, Up in Room, and Up in [address] [facility]? Nurse, PCA, and Self                 Level of Difficulty/Assistance: Independent     Pt resting in bed at this time on room air. Pt denies shortness of breath. Pt without lower extremity edema.      Patient and/or Family's stated Goal of Care this Admission: reduce shortness of breath, increase activity tolerance, better understand heart failure and disease management, be more comfortable, and reduce lower extremity edema prior to discharge        :

## 2021-05-21 LAB — POC ACT LR: 214 SEC

## 2021-05-27 NOTE — DISCHARGE SUMMARY
Hospital Medicine Discharge Summary    Patient ID: Ibrahima Smith      Patient's PCP: No primary care provider on file. Admit Date: 5/16/2021     Discharge Date: 5/20/2021      Admitting Physician: Red Daniel MD     Discharge Physician: Red Daniel MD     Discharge Diagnoses: Active Hospital Problems    Diagnosis     Dyspnea [R06.00]     Atrial flutter (HCC) [I48.92]     Acute systolic heart failure (HCC) [I50.21]     Cardiomyopathy ([facility] [address] [I42.9]     SIRS (systemic inflammatory response syndrome) ([facility] [address] [R65.10]     Uncontrolled hypertension [I10]     Obesity [E66.9]        The patient was seen and examined on day of discharge and this discharge summary is in conjunction with any daily progress note from day of discharge. Hospital Course:         CHF - acute systolic failure w/ reduced EF 30% by Echo dated 18 May. Likely due to hypertensive heart disease but may be rate related failure. Eventually ischemic etiology will have to be ruled out w/ future RHC/LHC planned when euvolemic. Continue diuresis as currently ordered and follow I/O as well as clinical response. CXR w/ cardiomegaly/vascular congestion w/ elevated BNP. Cardiology consulted and appreciated     Aflutter - on BBlocker w/ Dig d/c'd post DCCV. Tx dose LMWH. S/P LINDSAY/DCCV Wed 19 May w/out complications. Goal Mag++ >2 and K+ >4.      HTN - w/out known CAD and no evidence of active signs/sxs of ischemia/failure. Uncontrolled on admission. Currently improved on BBlocker.       Acute transaminitis - likely 2nd to above. Followed serial labs.       Anemia - etiology clinically unable to determine, w/out evidence of active bleeding/hemolysis. Stable and asymptomatic w/out indication for transfusion. Followed serial labs.      Morbid Obesity -  With Body mass index is 40.85 kg/m². Complicating assessment and treatment.  Placing patient at risk for multiple co-morbidities as well as early death and contributing to the patient's presentation. Counseled on weight loss.        Labs: For convenience and continuity at follow-up the following most recent labs are provided:      CBC:    Lab Results   Component Value Date    WBC 8.8 05/20/2021    HGB 15.3 05/20/2021    HCT 46.7 05/20/2021     05/20/2021       Renal:    Lab Results   Component Value Date     05/20/2021    K 4.6 05/20/2021     05/20/2021    CO2 31 05/20/2021    BUN 33 05/20/2021    CREATININE 1.2 05/20/2021    CALCIUM 9.6 05/20/2021    PHOS 3.8 05/20/2021         Significant Diagnostic Studies    Radiology:   XR CHEST (2 VW)   Final Result   Mild cardiomegaly and mild central pulmonary congestion which is more   prominent with no obvious infiltrate or effusion. Consults:     IP CONSULT TO CARDIOLOGY  IP CONSULT TO CASE MANAGEMENT  IP CONSULT TO FINANCIAL COUNSELOR    Disposition: Home    Condition at Discharge: Stable    Discharge Instructions/Follow-up:  w/ PCP 1-2 weeks and subspecialists as arranged. Code Status:  Full Code    Activity: activity as tolerated    Diet: regular diet      Discharge Medications:     Discharge Medication List as of 5/20/2021  2:10 PM           Details   spironolactone (ALDACTONE) 25 MG tablet Take 1 tablet by mouth daily, Disp-30 tablet, R-0Print      furosemide (LASIX) 40 MG tablet Take 1 tablet by mouth daily, Disp-30 tablet, R-0Print      metoprolol succinate (TOPROL XL) 100 MG extended release tablet Take 1 tablet by mouth 2 times daily, Disp-60 tablet, R-0Print      lisinopril (PRINIVIL;ZESTRIL) 20 MG tablet Take 1 tablet by mouth daily, Disp-30 tablet, R-0Print             Time Spent on discharge is more than 30 minutes in the examination, evaluation, counseling and review of medications and discharge plan.       Signed:    Anselmo Knapp MD   5/27/2021

## 2021-05-28 ENCOUNTER — HOSPITAL ENCOUNTER (OUTPATIENT)
Age: 46
Discharge: HOME | End: 2021-05-28
Payer: MEDICAID

## 2021-05-28 ENCOUNTER — OFFICE VISIT (OUTPATIENT)
Dept: PRIMARY CARE CLINIC | Age: 46
End: 2021-05-28
Payer: MEDICAID

## 2021-05-28 VITALS
DIASTOLIC BLOOD PRESSURE: 84 MMHG | BODY MASS INDEX: 40.65 KG/M2 | HEIGHT: 67 IN | TEMPERATURE: 97.8 F | SYSTOLIC BLOOD PRESSURE: 130 MMHG | OXYGEN SATURATION: 97 % | WEIGHT: 259 LBS | HEART RATE: 81 BPM

## 2021-05-28 DIAGNOSIS — R73.03 PREDIABETES: ICD-10-CM

## 2021-05-28 DIAGNOSIS — E80.6 HYPERBILIRUBINEMIA: ICD-10-CM

## 2021-05-28 DIAGNOSIS — I48.92 ATRIAL FLUTTER, UNSPECIFIED TYPE (HCC): Primary | ICD-10-CM

## 2021-05-28 DIAGNOSIS — I51.81 STRESS-INDUCED CARDIOMYOPATHY: ICD-10-CM

## 2021-05-28 DIAGNOSIS — K63.5 POLYP OF COLON, UNSPECIFIED PART OF COLON, UNSPECIFIED TYPE: ICD-10-CM

## 2021-05-28 DIAGNOSIS — I10 ESSENTIAL HYPERTENSION: ICD-10-CM

## 2021-05-28 PROBLEM — R65.10 SIRS (SYSTEMIC INFLAMMATORY RESPONSE SYNDROME) (HCC): Status: RESOLVED | Noted: 2021-05-17 | Resolved: 2021-05-28

## 2021-05-28 LAB
A/G RATIO: 1.3 (ref 1.1–2.2)
ALBUMIN SERPL-MCNC: 4.3 G/DL (ref 3.4–5)
ALP BLD-CCNC: 75 U/L (ref 40–129)
ALT SERPL-CCNC: 21 U/L (ref 10–40)
ANION GAP SERPL CALCULATED.3IONS-SCNC: 13 MMOL/L (ref 3–16)
AST SERPL-CCNC: 20 U/L (ref 15–37)
BILIRUB SERPL-MCNC: 0.5 MG/DL (ref 0–1)
BUN BLDV-MCNC: 23 MG/DL (ref 7–20)
CALCIUM SERPL-MCNC: 9.1 MG/DL (ref 8.3–10.6)
CHLORIDE BLD-SCNC: 101 MMOL/L (ref 99–110)
CO2: 26 MMOL/L (ref 21–32)
CREAT SERPL-MCNC: 1.5 MG/DL (ref 0.9–1.3)
GFR AFRICAN AMERICAN: >60
GFR NON-AFRICAN AMERICAN: 50
GLOBULIN: 3.2 G/DL
GLUCOSE BLD-MCNC: 90 MG/DL (ref 70–99)
POTASSIUM SERPL-SCNC: 4.5 MMOL/L (ref 3.5–5.1)
SODIUM BLD-SCNC: 140 MMOL/L (ref 136–145)
TOTAL PROTEIN: 7.5 G/DL (ref 6.4–8.2)

## 2021-05-28 PROCEDURE — 99203 OFFICE O/P NEW LOW 30 MIN: CPT | Performed by: FAMILY MEDICINE

## 2021-05-28 PROCEDURE — 36415 COLL VENOUS BLD VENIPUNCTURE: CPT

## 2021-05-28 PROCEDURE — 83036 HEMOGLOBIN GLYCOSYLATED A1C: CPT

## 2021-05-28 PROCEDURE — 80053 COMPREHEN METABOLIC PANEL: CPT

## 2021-05-28 ASSESSMENT — ENCOUNTER SYMPTOMS
DIARRHEA: 0
ABDOMINAL PAIN: 0
SHORTNESS OF BREATH: 0
CONSTIPATION: 0

## 2021-05-28 ASSESSMENT — PATIENT HEALTH QUESTIONNAIRE - PHQ9
SUM OF ALL RESPONSES TO PHQ QUESTIONS 1-9: 0
1. LITTLE INTEREST OR PLEASURE IN DOING THINGS: 0
SUM OF ALL RESPONSES TO PHQ QUESTIONS 1-9: 0
2. FEELING DOWN, DEPRESSED OR HOPELESS: 0

## 2021-05-28 NOTE — PROGRESS NOTES
Lindsey Santizo  1975    No Known Allergies  Current Outpatient Medications   Medication Sig Dispense Refill    spironolactone (ALDACTONE) 25 MG tablet Take 1 tablet by mouth daily 30 tablet 0    furosemide (LASIX) 40 MG tablet Take 1 tablet by mouth daily 30 tablet 0    metoprolol succinate (TOPROL XL) 100 MG extended release tablet Take 1 tablet by mouth 2 times daily 60 tablet 0    lisinopril (PRINIVIL;ZESTRIL) 20 MG tablet Take 1 tablet by mouth daily 30 tablet 0     No current facility-administered medications for this visit. Consultants:   Patient Care Team:  Tamera Gibbs DO as PCP - General (Family Medicine)    Chief Complaint:     Lindsey Santizo is a 51 y.o. male  who presents for New Patient with Personalized Prevention Plan Services. HPI:     65-year-old male seen in the office today for a hospital follow-up. Patient was recently hospitalized for heart failure with reduced ejection fraction found to be in atrial flutter. Patient had a direct-current cardioversion during his hospitalization and also underwent heart catheterization which showed nonischemic cardiomyopathy. Patient was discharged home on Eliquis, furosemide, spironolactone, metoprolol, and lisinopril. Patient states that he has been doing well since his hospital discharge. Patient states that his home blood pressures have been in the 723I for systolic pressure and the 21W for his diastolic pressure. Patient does state he is having frequent urination with the furosemide but otherwise is tolerating medications well. Patient had noted hyperbilirubinemia during his hospitalization. Patient denies any issues or problems with that in the past.    Patient was also found to be prediabetic on blood testing performed in October 2020 with a hemoglobin A1c of 5.7%. Patient also had a colonoscopy in 2015 with colonic polyps.   Patient was supposed to have a follow-up in 3 years and has not scheduled that Hyperbilirubinemia  -     COMPREHENSIVE METABOLIC PANEL; Future    Prediabetes  -     HEMOGLOBIN A1C; Future    Polyp of colon, unspecified part of colon, unspecified type  -     Deepali Emerson MD, Gastroenterology, [facility]    48-year-old male with    1. Nonischemic cardiomyopathy, atrial flutter, heart failure with reduced ejection fraction. Patient will continue the Eliquis, furosemide, spironolactone, metoprolol, and lisinopril as written. Patient will continue to monitor his home blood pressure. Patient does have follow-up next week with cardiology and plans to keep that appointment. We will recheck renal panel due to furosemide/Aldactone to monitor his potassium and kidney function. 2.  Hyperbilirubinemia. Patient will have liver function test performed to assess his bilirubin level. Further treatment/work-up based on those results. 3.  Prediabetes mellitus. Patient will have repeat hemoglobin A1c obtained. If greater than 5.7, will start Metformin to try to slow the progression to diabetes. Reviewed with patient in detail. 4.  History of colonic polyps. Patient will be referred to gastroenterology for repeat colonoscopy. 5.  Health maintenance. Patient has nonreactive HIV and hepatitis C testing per Care Everywhere. Will discuss Tdap update at follow-up appointment in 3 months.     Health Maintenance Due:  Health Maintenance Due   Topic Date Due    Hepatitis C screen  Never done    Pneumococcal 0-64 years Vaccine (1 of 2 - PPSV23) Never done    COVID-19 Vaccine (1) Never done    HIV screen  Never done    DTaP/Tdap/Td vaccine (1 - Tdap) Never done    Diabetes screen  Never done              Health Maintenance:  (F) Breast Cancer Screen:   (F) Cervical Cancer Screen:  (M) Prostate Cancer Screen: (USPSTF recs: men 53-77 yo discuss benefits/harm,  does not recommend testing PSA in men >75 yo (D):   (M) AAA Screen: (men 73-67 yo who has ever smoked (B), consider in nonsmokers if high risk):  CRC/Colonoscopy Screening:   Lung Ca Screening: Annual LDCT (+smoker age 49-80, smoked within 15 years, total of 20 pack yr history):  DEXA Screen:  HIV Screen: (16-65 yr old, and all pregnant patients): Hep C Screen: (18-79 yr old):  [facility] [address] Screen:  (all pts with cirrhosis and high risk Hep B (US q6 mo)):  Immunizations:    Return in about 3 months (around 8/28/2021) for Chronic Condition follow up. MA Note Attestation:  I have reviewed the chief complaint and history of present illness (including ROS and PFSH) and vital documentation by my staff and I agree with their documentation and have added where applicable. EMR Dragon/transcription disclaimer:  Much of this encounter note is electronic transcription/translation of spoken language to printed texts. The electronic translation of spoken language may be erroneous, or at times, nonsensical words or phrases may be inadvertently transcribed.   Although I have reviewed the note for such errors, some may still exist.

## 2021-05-28 NOTE — PROGRESS NOTES
[address] [facility]   Cardiovascular Evaluation    PATIENT: Jory Mazariegos  DATE: 2021  MRN: [id number]  CSN: [id number]  : 1975      Primary Care Doctor: Princess John DO  Reason for evaluation:   Follow-up for atrial flutter, non-ischemic cardiomyopathy    Subjective:   History of present illness on initial date of evaluation:  Tito ZapataKelsy Blancas is a 54-year-old male with a history of atrial flutter, LV systolic heart failure secondary to non-ischemic cardiomyopathy, essential hypertension, nephrolithiasis, prediabetes, and morbid obesity who presents for follow-up. The patient was recently admitted to [facility] from -21 with atrial flutter and new onset heart failure. On admission, ventricular rate was in the 130s and Pro-BNP was elevated at 1,895. He was also markedly hypertensive at 201/149. TTE showed an LVEF of 30% with global hypokinesis, moderate concentric LVH, trivial pericardial effusion, and no hemodynamically significant valvular abnormalities. The RV was not well visualized. He was gradually diuresed over the course of his admission and underwent successful LINDSAY/DCCV to normal sinus rhythm on 21. He then underwent invasive coronary angiography/LHC/RHC on 21 which demonstrated normal coronary arteries and the following hemodynamics: RA 11, RV 45/11, PA 44/24 (30), PCWP 16, Lilliam CO 7.06, Lilliam CI 3.2, Lilliam SVR 1,156, and Lilliam . He was discharged on lisinopril 20 mg daily, metoprolol succinate 100 mg BID, spironolactone 25 mg daily, and and lasix 40 mg daily. He apparently was not sent home on anticoagulation. Since discharge, the patient reports that he has been doing very well. He denies any chest pain or shortness of breath and says that his lower extremity edema has resolved. He further denies palpitations, lightheadedness, dizziness, orthopnea, or paroxysmal nocturnal dyspnea.   He has been checking his BP regularly at home and says that his SBP typically runs in the 130-140s. BP is 142/80 today in clinic. Patient Active Problem List   Diagnosis    Obesity    Atrial flutter ([address]    Cardiomyopathy ([address]    Dyspnea    Essential hypertension    Prediabetes    Polyp of colon         Past Medical History:   has a past medical history of Acute systolic heart failure ([address] Diverticulitis, Hypertension, Kidney stone, SIRS (systemic inflammatory response syndrome) ([address] and Uncontrolled hypertension. Surgical History:   has a past surgical history that includes Foot surgery (Right); Colonoscopy; and Colonoscopy (2015). Social History:   reports that he has never smoked. He has never used smokeless tobacco. He reports that he does not drink alcohol and does not use drugs. Family History:  No evidence for sudden cardiac death or premature CAD    Home Medications:  Reviewed and are listed in nursing record. and/or listed below  Current Outpatient Medications   Medication Sig Dispense Refill    spironolactone (ALDACTONE) 25 MG tablet Take 1 tablet by mouth daily 30 tablet 0    furosemide (LASIX) 40 MG tablet Take 1 tablet by mouth daily 30 tablet 0    metoprolol succinate (TOPROL XL) 100 MG extended release tablet Take 1 tablet by mouth 2 times daily 60 tablet 0    lisinopril (PRINIVIL;ZESTRIL) 20 MG tablet Take 1 tablet by mouth daily 30 tablet 0    metFORMIN (GLUCOPHAGE-XR) 500 MG extended release tablet Take 1 tablet by mouth daily (with breakfast) (Patient not taking: Reported on 6/1/2021) 90 tablet 3     No current facility-administered medications for this visit. Allergies:  Patient has no known allergies. Review of Systems:   A 14 point review of symptoms completed. Pertinent positives identified in the HPI, all other review of symptoms negative as below. Review of Systems   Constitutional: Negative for chills and fever. HENT: Negative for congestion, rhinorrhea and sore throat.     Eyes: Negative for photophobia, pain and visual disturbance. Respiratory: Negative for cough and shortness of breath. Cardiovascular: Negative for chest pain, palpitations and leg swelling. Gastrointestinal: Negative for abdominal pain, constipation, diarrhea, nausea and vomiting. Endocrine: Negative for cold intolerance and heat intolerance. Genitourinary: Positive for frequency. Negative for difficulty urinating, dysuria and hematuria. Musculoskeletal: Negative for arthralgias, joint swelling and myalgias. Skin: Negative for rash and wound. Allergic/Immunologic: Negative for environmental allergies and food allergies. Neurological: Negative for dizziness, syncope and light-headedness. Hematological: Negative for adenopathy. Does not bruise/bleed easily. Psychiatric/Behavioral: Negative for dysphoric mood. The patient is not nervous/anxious. Objective:   PHYSICAL EXAM:    Vitals:    06/01/21 1253   BP: (!) 142/80   Pulse:    SpO2:     Weight: 253 lb (114.8 kg)     Wt Readings from Last 3 Encounters:   06/01/21 253 lb (114.8 kg)   05/28/21 259 lb (117.5 kg)   05/20/21 253 lb 1.6 oz (114.8 kg)     General: Morbidly obese adult male in no acute distress. Pleasant and interactive on exam.  HEENT: Normocephalic, atraumatic, non-icteric, hearing intact, nares normal, mucous membranes moist.  Neck: No JVD. Heart: Regular rate and rhythm. Normal S1 and S2. No murmurs, gallops or rubs. Lungs: Normal respiratory effort. Clear to auscultation bilaterally. No wheezes, rales, or rhonchi. Abdomen: Soft, non-tender. Normoactive bowel sounds. No masses or organomegaly. Skin: No rashes, wounds, or lesions. Pulses: 2+ and symmetric. Extremities: No clubbing, cyanosis, or edema. Psych: Normal mood and affect. Neuro: Alert and oriented to person, place, and time. No focal deficits noted.       LABS   CBC:      Lab Results   Component Value Date    WBC 8.8 05/20/2021    RBC 5.39 05/20/2021    HGB 15.3 05/20/2021    HCT 46.7 05/20/2021    MCV 86.7 05/20/2021    RDW 15.2 05/20/2021     05/20/2021     CMP:  Lab Results   Component Value Date     05/28/2021    K 4.5 05/28/2021     05/28/2021    CO2 26 05/28/2021    BUN 23 05/28/2021    CREATININE 1.5 05/28/2021    GFRAA >60 05/28/2021    GFRAA >60 10/07/2010    AGRATIO 1.3 05/28/2021    LABGLOM 50 05/28/2021    GLUCOSE 90 05/28/2021    PROT 7.5 05/28/2021    PROT 7.6 10/04/2010    CALCIUM 9.1 05/28/2021    BILITOT 0.5 05/28/2021    ALKPHOS 75 05/28/2021    AST 20 05/28/2021    ALT 21 05/28/2021     PT/INR:   No results found for: PTINR  Liver:  No components found for: CHLPL  Lab Results   Component Value Date    ALT 21 05/28/2021    AST 20 05/28/2021    ALKPHOS 75 05/28/2021    BILITOT 0.5 05/28/2021     Lab Results   Component Value Date    LABA1C 6.1 05/28/2021     Lipids:         Lab Results   Component Value Date    TRIG 99 05/18/2021    TRIG 62 05/17/2021            Lab Results   Component Value Date    HDL 33 (L) 05/18/2021    HDL 34 (L) 05/17/2021            Lab Results   Component Value Date    LDLCALC 70 05/18/2021    LDLCALC 65 05/17/2021            Lab Results   Component Value Date    LABVLDL 20 05/18/2021    LABVLDL 12 05/17/2021         CARDIAC DATA   LAST EKG: Sinus rhythm with PACs, possible left atrial enlargement. ECHO:  TTE 5/16/21:  Conclusions   Summary   Technically difficult examination. Left ventricular function and wall motion is difficult to estimate due to   poor endocardial visualization. Moderate LV dysfunction with global hypokinesis, EF 30%   Normal left ventricular size with moderate concentric left ventricular   hypertrophy. LINDSAY 5/19/21:  Conclusions   Summary   Left ventricular systolic function is severely reduced with LVEF visually   estimated at 30%. Right ventricular systolic function is reduced. There is no evidence of mass or thrombus in the left atrium or appendage.    The right atrium is dilated. Mild tricuspid regurgitation. STRESS TEST: N/A    CARDIAC CATH:   [facility] 5/20/21:  Findings:  1. Hemodynamics:   A. Right heart catheterization                   1. RA: 11 mmHg                   2. RV: 45/11 mmHg                   3. PA: 44/24 (30) mmHg                   4. PCWP: 16 mmHg                   5. Saturations: RA 70%, PA 68%, AO 90%                   6. Lilliam CO: 7.06 L/min                   7. Lilliam CI: 3.2 L/min*m2                   8. Lilliam SVR: 1,156 dyne*sec/cm5                   9. Lilliam PVR: 159 dyne*sec/cm5              B. Opening arterial pressure: 140/91 (113) mmHg              C. LVEDP: 17 mmHg     2. Coronary anatomy:  A. Left main artery: The left main artery bifurcates into the left anterior descending artery and left circumflex artery. The left main artery is short but otherwise has no angiographically significant disease. B. Left anterior descending artery: Transapical vessel which gives rise to 2 diagonal arteries. The LAD  has no angiographically significant disease. .  The diagonal arteries have no angiographically significant disease. C. Left circumflex artery: Non-dominant vessel that gives rise to 3 obtuse marginal arteries and 2 posterolateral branches. The LCx has no angiographically significant disease. The OM1 is a small vessel with no angiographically significant disease. The OM2 is a large vessel with no angiographically significant disease. The OM3 is a small vessel with no angiographically significant disease. The left posterolateral branches have no angiographically significant disease. D. Right coronary artery: Dominant vessel. The RCA has no angiographically significant disease. The posterior descending artery has no angiographically significant disease. Assessment:     1. Compensated LV systolic heart failure/non-ischemic cardiomyopathy - LVEF 30% on TTE from 5/16/21 with global hypokinesis.   Suspect cardiomyopathy was tachycardia-induced in setting of atrial flutter. Invasive coronary angiography from 5/20/21 demonstrated normal coronary arteries. Patient currently appears clinically euvolemic and is NYHA functional class I.  2. Atrial flutter - S/p LINDSAY and DCCV on 5/19/21. [id number] is 2. He apparently was not discharged on anticoagulation following his recent admission. 3. Essential hypertension - BP continues to consistently run slightly above goal on home readings and is currently mildly elevated at 142/80. 4. Prediabetes - Last HbA1c 6.1%. 5. Moderate LVH  6. Morbid obesity  7. H/o nephrolithiasis      Plan:     1. Start Eliquis 5 mg BID (was apparently not discharged on anticoagulation following recent admission). 2. Increase lisinopril to 40 mg daily. 3. Decrease lasix to 20 mg daily. Instructed patient to weigh himself daily every morning and to take an additional 20 mg lasix for weight gain >3 lbs in one day or 5 lbs over three days and to notify our clinic of these changes. 4. Continue metoprolol succinate 100 mg BID and spironolactone 25 mg daily. 5. Asked patient to continue checking his BP regularly at home and to keep a log of his recordings which he can bring with him to his next clinic visit. 6. Discussed importance of weight loss, heart healthy, low sodium/low carbohydrate diet, and regular physical exercise for at least 30 minutes 3-5 times per week. 7. Will plan to repeat TTE after ~3 months of GDMT to re-assess cardiac function and if LVEF still <35%, will refer to EP for ICD. 8. Follow-up with me in 3 months. This note was scribed in the presence of Michael Julian DO by Van Walton RN. It is a pleasure to assist in the care of Jhon Castillo. Please call with any questions. The scribes documentation has been prepared under my direction and personally reviewed by me in its entirety.   I confirm that the note above accurately reflects all work, treatment, procedures, and medical decision making performed by me.  I, Dr. Lucero, personally performed the services described in this documentation as scribed by Josefina Bullock RN in my presence, and it is both accurate and complete to the best of our ability.          Royal Salomon, [address] [place] [address]  [phone] [facility]  [phone] [address] [place] [address]

## 2021-05-29 LAB
ESTIMATED AVERAGE GLUCOSE: 128.4 MG/DL
HBA1C MFR BLD: 6.1 %

## 2021-06-01 ENCOUNTER — OFFICE VISIT (OUTPATIENT)
Dept: CARDIOLOGY CLINIC | Age: 46
End: 2021-06-01
Payer: MEDICAID

## 2021-06-01 VITALS
HEIGHT: 67 IN | BODY MASS INDEX: 39.71 KG/M2 | OXYGEN SATURATION: 93 % | WEIGHT: 253 LBS | HEART RATE: 76 BPM | SYSTOLIC BLOOD PRESSURE: 142 MMHG | DIASTOLIC BLOOD PRESSURE: 80 MMHG

## 2021-06-01 DIAGNOSIS — I48.92 ATRIAL FLUTTER, UNSPECIFIED TYPE (HCC): ICD-10-CM

## 2021-06-01 DIAGNOSIS — E66.01 MORBID OBESITY (HCC): ICD-10-CM

## 2021-06-01 DIAGNOSIS — I10 ESSENTIAL HYPERTENSION: ICD-10-CM

## 2021-06-01 DIAGNOSIS — I50.22 CHRONIC SYSTOLIC HEART FAILURE (HCC): ICD-10-CM

## 2021-06-01 DIAGNOSIS — I51.7 LVH (LEFT VENTRICULAR HYPERTROPHY): ICD-10-CM

## 2021-06-01 DIAGNOSIS — I42.8 NON-ISCHEMIC CARDIOMYOPATHY (HCC): Primary | ICD-10-CM

## 2021-06-01 DIAGNOSIS — R73.03 PREDIABETES: Primary | ICD-10-CM

## 2021-06-01 DIAGNOSIS — R73.03 PREDIABETES: ICD-10-CM

## 2021-06-01 PROCEDURE — 99214 OFFICE O/P EST MOD 30 MIN: CPT | Performed by: INTERNAL MEDICINE

## 2021-06-01 RX ORDER — FUROSEMIDE 20 MG/1
20 TABLET ORAL DAILY
Qty: 30 TABLET | Refills: 0
Start: 2021-06-01 | End: 2021-06-21 | Stop reason: SDUPTHER

## 2021-06-01 RX ORDER — METFORMIN HYDROCHLORIDE 500 MG/1
500 TABLET, EXTENDED RELEASE ORAL
Qty: 90 TABLET | Refills: 3 | Status: SHIPPED | OUTPATIENT
Start: 2021-06-01 | End: 2022-06-01

## 2021-06-01 RX ORDER — LISINOPRIL 40 MG/1
40 TABLET ORAL DAILY
Qty: 90 TABLET | Refills: 2 | Status: SHIPPED | OUTPATIENT
Start: 2021-06-01 | End: 2022-04-19

## 2021-06-01 NOTE — PATIENT INSTRUCTIONS
Patient Plan:  1. Increase the Lisinopril to 40 mg for better blood pressure control. 2. Continue to monitor you blood pressure at home. 3. May decrease the furosemide (Lasix) to 20 mg daily  4. Weigh yourself daily. Should you gain 3 lbs in 1 day or 5 lbs in 1 week you may take an extra Lasix    Do not add salt to your foods   Try to avoid as much red meat as possible   Remain as active as possible and exercise   5. Recommend starting Eliquis 5 mg daily due to the atrial flutter for stroke prevention  6. Recommend calling the hospital to check on your insurance status  7. Follow up in 3 months   8. Echocardiogram to view size and strength of the heart in 3 months     Your provider has ordered testing for further evaluation. An order/prescription has been included in your paper work.  To schedule outpatient testing, contact Central Scheduling by calling [address] [place] [address] ([phone]).

## 2021-06-06 ASSESSMENT — ENCOUNTER SYMPTOMS
PHOTOPHOBIA: 0
DIARRHEA: 0
NAUSEA: 0
VOMITING: 0
EYE PAIN: 0
ABDOMINAL PAIN: 0
SORE THROAT: 0
SHORTNESS OF BREATH: 0
COUGH: 0
CONSTIPATION: 0
RHINORRHEA: 0

## 2021-06-21 ENCOUNTER — TELEPHONE (OUTPATIENT)
Dept: PRIMARY CARE CLINIC | Age: 46
End: 2021-06-21

## 2021-06-21 RX ORDER — FUROSEMIDE 20 MG/1
20 TABLET ORAL DAILY
Qty: 30 TABLET | Refills: 0 | Status: SHIPPED | OUTPATIENT
Start: 2021-06-21 | End: 2021-06-23

## 2021-06-21 RX ORDER — SPIRONOLACTONE 25 MG/1
25 TABLET ORAL DAILY
Qty: 30 TABLET | Refills: 0 | Status: SHIPPED | OUTPATIENT
Start: 2021-06-21 | End: 2021-06-23

## 2021-06-21 RX ORDER — METOPROLOL SUCCINATE 100 MG/1
100 TABLET, EXTENDED RELEASE ORAL 2 TIMES DAILY
Qty: 60 TABLET | Refills: 0 | Status: SHIPPED | OUTPATIENT
Start: 2021-06-21 | End: 2021-06-23

## 2021-06-21 NOTE — TELEPHONE ENCOUNTER
Patient is calling needing a refill of     spironolactone (ALDACTONE) 25 MG tablet    metoprolol succinate (TOPROL XL) 100 MG extended release tablet    furosemide (LASIX) 20 MG tablet    This will be a first fill for our office from the [facility] [phone] POCUS: Emergency Department Focused Ultrasound performed at patient's bedside.  The complete report will be available in PACS.

## 2021-06-23 RX ORDER — SPIRONOLACTONE 25 MG/1
25 TABLET ORAL DAILY
Qty: 90 TABLET | Refills: 3 | Status: SHIPPED | OUTPATIENT
Start: 2021-06-23 | End: 2022-07-15

## 2021-06-23 RX ORDER — FUROSEMIDE 20 MG/1
20 TABLET ORAL DAILY
Qty: 90 TABLET | Refills: 3 | Status: SHIPPED | OUTPATIENT
Start: 2021-06-23 | End: 2022-07-15

## 2021-06-23 RX ORDER — METOPROLOL SUCCINATE 100 MG/1
100 TABLET, EXTENDED RELEASE ORAL 2 TIMES DAILY
Qty: 180 TABLET | Refills: 3 | Status: SHIPPED | OUTPATIENT
Start: 2021-06-23

## 2022-04-19 RX ORDER — LISINOPRIL 40 MG/1
40 TABLET ORAL DAILY
Qty: 90 TABLET | Refills: 1 | Status: SHIPPED | OUTPATIENT
Start: 2022-04-19 | End: 2022-07-18

## 2022-04-21 NOTE — CONSULTS
0.9 05/17/2021    ALKPHOS 88 05/17/2021    AST 32 05/17/2021    ALT 64 05/17/2021     PT/INR:  No results found for: PTINR  HgBA1c:No results found for: LABA1C  Lab Results   Component Value Date    TROPONINI <0.01 05/17/2021         Cardiac Data     Last EKG:   Atrial flutter with ventricular rate 140 bpm, non-specific intra-ventricular conduction delay. Echo: N/A    Stress Test: N/A    Cath: N/a    Other Studies:   Chest X-ray 5/16/21:  Mild cardiomegaly and mild central pulmonary congestion which is more   prominent with no obvious infiltrate or effusion. Assessment:      Jeanie Salas Geeta Bell is a 51 y.o. male with a history of morbid obesity, essential hypertension, and nephrolithiasis admitted with acute heart failure, hypertensive urgency, and newly diagnosed atrial flutter. 1. Acute heart failure - May have been precipitated by combination of atrial flutter and hypertensive urgency. Patient has not had recent assessment of cardiac structure and function. 2. Newly diagnosed atrial flutter - Ventricular rate currently in 130-140s. [id number] is at least 2.  3. Hypertensive urgency - BP as high as 201/149 on admission and is currently 156/107.  4. Morbid obesity  5. H/o nephrolithiasis       Plan:     1. Load with IV digoxin. 2. Start therapeutic SC Lovenox 1 mg/kg and will transition to [address] once clear he is not going to require any invasive procedures. 3. Increase metoprolol succinate to 50 mg daily. 4. Start lisinopril 10 mg daily and titrate as tolerated. 5. Continue to diurese with IV lasix 40 mg BID and re-dose as needed to make patient goal net negative 2-3L over the next 24 hours. 6. Obtain transthoracic echocardiogram for formal evaluation of cardiac structure and function. 7. Continue to monitor on telemetry while inpatient. 8. Strict I/Os, daily standing weights. 9. 1.5L per day fluid restriction, low sodium diet.   10. Trend renal panels and replete electrolytes as needed to
No

## 2022-07-15 RX ORDER — FUROSEMIDE 20 MG/1
20 TABLET ORAL DAILY
Qty: 90 TABLET | Refills: 3 | Status: SHIPPED | OUTPATIENT
Start: 2022-07-15 | End: 2023-07-10

## 2022-07-15 RX ORDER — SPIRONOLACTONE 25 MG/1
25 TABLET ORAL DAILY
Qty: 90 TABLET | Refills: 3 | Status: SHIPPED | OUTPATIENT
Start: 2022-07-15 | End: 2023-07-10

## 2023-02-13 NOTE — TELEPHONE ENCOUNTER
[facility] last OV 06/01/21. Attempted to reach patient. No answer. Left VM to call office to schedule f/u prior to script being sent for fulfillment.

## 2023-02-14 NOTE — TELEPHONE ENCOUNTER
Please schedule pt first available OV with [facility] for med refills, then route back to board. Thank you!      Last OV [facility] 6/6/21

## 2023-02-14 NOTE — TELEPHONE ENCOUNTER
2/14 called ([phone]) unable to make contact with pt. LM for pt to call [facility] to schedule appt with [facility]. Will attempt to reach pt at a later time.

## 2023-02-17 NOTE — TELEPHONE ENCOUNTER
02/17-Called ([phone]) unable to make contact with [facility]. Letter has been mailed to listed address.

## 2023-02-22 RX ORDER — LISINOPRIL 40 MG/1
40 TABLET ORAL DAILY
Qty: 30 TABLET | Refills: 0 | Status: SHIPPED | OUTPATIENT
Start: 2023-02-22 | End: 2023-05-23

## 2023-04-27 ENCOUNTER — OFFICE VISIT (OUTPATIENT)
Dept: URGENT CARE | Age: 48
End: 2023-04-27

## 2023-04-27 VITALS
RESPIRATION RATE: 17 BRPM | HEIGHT: 66 IN | SYSTOLIC BLOOD PRESSURE: 144 MMHG | TEMPERATURE: 97.8 F | WEIGHT: 265 LBS | BODY MASS INDEX: 42.59 KG/M2 | DIASTOLIC BLOOD PRESSURE: 94 MMHG | HEART RATE: 121 BPM | OXYGEN SATURATION: 99 %

## 2023-04-27 DIAGNOSIS — M25.572 CHRONIC PAIN OF LEFT ANKLE: Primary | ICD-10-CM

## 2023-04-27 DIAGNOSIS — R03.0 ELEVATED BLOOD PRESSURE READING: ICD-10-CM

## 2023-04-27 DIAGNOSIS — G89.29 CHRONIC PAIN OF LEFT ANKLE: Primary | ICD-10-CM

## 2023-04-27 ASSESSMENT — ENCOUNTER SYMPTOMS: RESPIRATORY NEGATIVE: 1

## 2023-04-27 NOTE — PATIENT INSTRUCTIONS
Rest, ice, compression with ace wrap and elevation for 1 week   Follow up with PCP as scheduled on 4/28/23

## 2023-04-27 NOTE — PROGRESS NOTES
Collette Quiver (:  1975) is a 51 y.o. male,New patient, here for evaluation of the following chief complaint(s): Ankle Pain (Ankle pain due to an old fracture )      ASSESSMENT/PLAN:    ICD-10-CM    1. Chronic pain of left ankle  M25.572 XR ANKLE LEFT (MIN 3 VIEWS)    G89.29 Apply ace wrap      2. Elevated blood pressure reading  R03.0       XRAY:  FINDINGS:  Soft tissue swelling is seen. No acute fracture or malalignment noted. There is spurring at the Achilles and plantar fascia insertion. Subtle  spurring is seen at the tibiotalar joint. IMPRESSION:  No acute osseous abnormality    Rest, ice, compression with ace wrap and elevation for 1 week   Follow up with PCP as scheduled on 23  Return if symptoms worsen or fail to improve. Discussed elevated blood pressure. Instructed to monitor BP daily and follow up with PCP as needed. Reviewed xray results with patient  SUBJECTIVE/OBJECTIVE:  51year old male presents with c/o ankle pain left ankle pain for 2 days. Had ankle fracture 20 years ago to same ankle. States that it randomly seems to flare. He has treated with ibuprofen - last taken 3 hours ago with mild effectiveness. He has mild edema. Denies recent trauma to area. Denies pain at rest. He rates pain 7/10 with walking on it. Describes a sharp pain that is mostly constant. History provided by:  Patient   used: No      Vitals:    23 1108   BP: (!) 144/94   Pulse: (!) 121   Resp: 17   Temp: 97.8 °F (36.6 °C)   SpO2: 99%   Weight: 265 lb (120.2 kg)   Height: 5' 6\" (1.676 m)       Review of Systems   Constitutional: Negative. HENT: Negative. Respiratory: Negative. Cardiovascular: Negative. Musculoskeletal:         Left medial ankle pain, worse with movement    Neurological: Negative. Physical Exam  Vitals reviewed. Constitutional:       General: He is not in acute distress. Appearance: He is obese. He is not ill-appearing.